# Patient Record
Sex: MALE | Race: WHITE | NOT HISPANIC OR LATINO | Employment: OTHER | URBAN - METROPOLITAN AREA
[De-identification: names, ages, dates, MRNs, and addresses within clinical notes are randomized per-mention and may not be internally consistent; named-entity substitution may affect disease eponyms.]

---

## 2017-03-29 RX ORDER — ALLOPURINOL 300 MG/1
TABLET ORAL
Qty: 30 TABLET | Refills: 1 | Status: SHIPPED | OUTPATIENT
Start: 2017-03-29 | End: 2017-12-04

## 2017-08-30 ENCOUNTER — PATIENT OUTREACH (OUTPATIENT)
Dept: ADMINISTRATIVE | Facility: HOSPITAL | Age: 67
End: 2017-08-30

## 2017-08-30 NOTE — PROGRESS NOTES
Pt returned my call and stated he will call back due to his wife is having some surgeries.  Pt also states he know he is way overdue.

## 2017-08-30 NOTE — LETTER
August 30, 2017    Raphael Nelson Jr.  [address] [facility] [address]           [facility]  [address]  [facility] [address]  Phone: [phone] Dear Raphael Nelson Jr.    In the spirit of maintaining your good health, our system indicates that you have not been seen in the office in over 12 months and due for a visit.     Our system indicates that you are due for the following:   Health Maintenance Due   Topic Date Due    Hepatitis C Screening  1950    Influenza Vaccine  08/01/2017     Kurt Davey MD would like you to schedule an appointment for an annual exam at your earliest convenience. If you have completed any of these health maintenance requirements at an outside facility, please contact our office so we may update your health record.    If your PRIMARY CARE PHYSICIAN has changed please contact your insurance company to reflect the correct physician.    If you have any issues or need assistance in scheduling this appointment, please call the number below.     LUIZ Lovelace  Care Coordination Department  [facility]  [phone]

## 2017-12-04 ENCOUNTER — OFFICE VISIT (OUTPATIENT)
Dept: FAMILY MEDICINE | Facility: CLINIC | Age: 67
End: 2017-12-04
Payer: COMMERCIAL

## 2017-12-04 VITALS
BODY MASS INDEX: 29.48 KG/M2 | SYSTOLIC BLOOD PRESSURE: 126 MMHG | DIASTOLIC BLOOD PRESSURE: 77 MMHG | HEART RATE: 72 BPM | HEIGHT: 69 IN | WEIGHT: 199.06 LBS

## 2017-12-04 DIAGNOSIS — R53.83 FATIGUE, UNSPECIFIED TYPE: Primary | ICD-10-CM

## 2017-12-04 DIAGNOSIS — Z87.898 HISTORY OF ELEVATED PSA: ICD-10-CM

## 2017-12-04 DIAGNOSIS — Z13.6 ENCOUNTER FOR LIPID SCREENING FOR CARDIOVASCULAR DISEASE: ICD-10-CM

## 2017-12-04 DIAGNOSIS — R07.9 RIGHT-SIDED CHEST PAIN: ICD-10-CM

## 2017-12-04 DIAGNOSIS — G47.9 SLEEP DISTURBANCE: ICD-10-CM

## 2017-12-04 DIAGNOSIS — Z11.59 NEED FOR HEPATITIS C SCREENING TEST: ICD-10-CM

## 2017-12-04 DIAGNOSIS — N40.0 BENIGN PROSTATIC HYPERPLASIA, UNSPECIFIED WHETHER LOWER URINARY TRACT SYMPTOMS PRESENT: ICD-10-CM

## 2017-12-04 DIAGNOSIS — Z13.220 ENCOUNTER FOR LIPID SCREENING FOR CARDIOVASCULAR DISEASE: ICD-10-CM

## 2017-12-04 PROCEDURE — 90662 IIV NO PRSV INCREASED AG IM: CPT | Mod: S$GLB,,, | Performed by: FAMILY MEDICINE

## 2017-12-04 PROCEDURE — 99999 PR PBB SHADOW E&M-EST. PATIENT-LVL III: CPT | Mod: PBBFAC,,, | Performed by: FAMILY MEDICINE

## 2017-12-04 PROCEDURE — 99214 OFFICE O/P EST MOD 30 MIN: CPT | Mod: 25,S$GLB,, | Performed by: FAMILY MEDICINE

## 2017-12-04 PROCEDURE — 90471 IMMUNIZATION ADMIN: CPT | Mod: S$GLB,,, | Performed by: FAMILY MEDICINE

## 2017-12-04 RX ORDER — MULTIVIT WITH MINERALS/HERBS
1 TABLET ORAL DAILY
COMMUNITY

## 2017-12-04 NOTE — PROGRESS NOTES
Subjective:      Patient ID: Raphael Nelson Jr. is a 67 y.o. male.    Chief Complaint: Annual Exam    HPI he complains of fatigue.   Fatigue: Patient complains of fatigue. Symptoms began several years ago. Sentinal symptom the patient feels fatigue began with: none. Symptoms of his fatigue have been general malaise. Patient describes the following psychologic symptoms: none.  Patient denies none. Symptoms have gradually worsened. Severity has been symptoms bothersome, but easily able to carry out all usual work/school/family activities. Previous visits for this problem: none.  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 He awakens tired.  He does not awaken drunk.  EPWORTH SLEEPINESS SCALE (ESS) -- The ESS subjectively measures sleepiness as it occurs in ordinary life situations. It can be used as a screening test for excessive sleepiness or to follow an individual's subjective response to an intervention.    Eight situations were described to the patient and the following responses were obtained on a questionnaire.  The question was, in the following situations, what is the likelihood that sleep would be induced?  Sitting and reading 0   Watching television 3   Sitting inactively in a public place 3   Riding as a passenger in a car for one hour without a break 3   Lying down to rest in the afternoon when circumstances permit 3   Sitting and talking with someone 0   Sitting quietly after lunch without alcohol 3   Sitting in a car as the , while stopped for a few minutes in traffic 0   TOTAL POINTS 15     Each situation receives a score of zero to three, which is related to the likelihood that sleep will be induced:  0 = would never doze  1 = slight chance of dozing  2 = moderate chance of dozing  3 = high chance of dozing    Thus, the total ESS score can range from zero to 24, with higher scores correlating with increasing degrees of sleepiness     1-6 points: Normal sleep   7-8 points: Average sleepiness    9-24 points: Abnormal (possibly pathologic) sleepiness     A score greater than ten is consistent with excessive sleepiness. This threshold between normal and abnormal subjective sleepiness is derived from an observational study of 180 adults. The mean ESS score was approximately six among healthy adults, compared to 16 or greater among patients with narcolepsy, idiopathic hypersomnia, or moderate to severe obstructive sleep apnea (LESTER, defined as a respiratory disturbance index [RDI] greater than 15 events per hour).  The ESS can be performed in the examination room or waiting room. It is relatively simple and generally takes only a few minutes to complete. It should be repeated at subsequent visits to assess for change.  Correlation -- Subjective sleepiness measured by the ESS appears to correlate moderately with objective sleep tendency. In the largest population based study, the increased risk of falling asleep during a four session multiple sleep latency test (MSLT) was 30 percent and 69 percent in individuals with intermediate (ESS score six to 11) and high (ESS score >12) subjective sleepiness, respectively. Despite this study, the strength of the correlation between the ESS and objective measures of sleepiness remains controversial due to conflicting small studies:  Early studies demonstrated strong correlation between the ESS and MSLT. This included a study that reported that a score derived from only three of the situations -- sitting inactively in a public place, sitting quietly after lunch, and sitting in a car stopped for a few minutes -- correlated well with mean sleep latency measured by the MSLT.  More recent studies have found little or no correlation between the ESS and the MSLT. In addition, weak correlation and discordant results between the ESS and the maintenance of wakefulness test (MWT) have been reported among patients with sleep apnea and narcolepsy. Advocates for the viewpoint that the  ESS correlates poorly with the MSLT and MWT hypothesize that the tests measure different aspects of sleepiness.  Advantages -- The ESS is widely available, reliable for assessing subjective sleepiness, and can be performed easily and quickly.  Disadvantages -- The ESS cannot be used on multiple occasions throughout the day and is not a good measure of short-term acute sleep loss. Although the ESS score tends to increase with the severity of LESTER and most patients with moderate to severe LESTER have an ESS score greater than ten, the ESS should not be used to screen for LESTER because it is neither sensitive nor specific for this purpose.    He does urinate at night also.  He drinks a lot of fluids. He is seeing Dr. Jones for the prostate.   He had extreme prostatitis several years ago and had a biopsy due to an increased PSA which was neg.   Lab Results   Component Value Date    PSA 1.5 05/14/2015      he also has had problemswith continue right chest wall pain. He has had a CXR and bone scan in 2015 but the pain persists and this causes him increased problems with sleeping.  He has not had exertional chest pain at all as he is very active.     Health Maintenance Due   Topic Date Due    Hepatitis C Screening  1950    Influenza Vaccine  08/01/2017       Past Medical History:  Past Medical History:   Diagnosis Date    Allergy     Anxiety     BPH (benign prostatic hyperplasia)     Cataract     Rotator cuff disorder      Past Surgical History:   Procedure Laterality Date    ADENOIDECTOMY      APPENDECTOMY      COLONOSCOPY  3/1/2013    was normal.    TONSILLECTOMY       Review of patient's allergies indicates:   Allergen Reactions    Codeine      Itching     Current Outpatient Prescriptions on File Prior to Visit   Medication Sig Dispense Refill    arginine HCl, L-arginine, 1,000 mg Tab Take by mouth once daily.      cholecalciferol, vitamin D3, (VITAMIN D3) 5,000 unit Tab Take 5,000 Units by mouth once  daily.      CIALIS 5 mg tablet Take 2.5 mg by mouth daily as needed.       ciclesonide (OMNARIS) 50 mcg Spry 2 sprays by Each Nare route once daily. 12.5 g 11    co-enzyme Q-10 30 mg capsule Take 200 mg by mouth once daily.      glucosamine HCl-msm 750-750 mg Tab Take 750 mg by mouth once daily.      ibuprofen (ADVIL) 200 MG tablet Take 400 mg by mouth every 6 (six) hours as needed for Pain.      krill oil 500 mg Cap Take by mouth once daily.      loratadine 10 mg Cap Take by mouth every other day.      multivitamin capsule Take 1 capsule by mouth once daily.      zinc gluconate 30 mg Tab Take by mouth once daily.      [DISCONTINUED] allopurinol (ZYLOPRIM) 300 MG tablet TAKE ONE TABLET BY MOUTH EVERY DAY AS NEEDED 30 tablet 1     No current facility-administered medications on file prior to visit.      Social History     Social History    Marital status:      Spouse name: Cynthia    Number of children: 5    Years of education: N/A     Occupational History    Not on file.     Social History Main Topics    Smoking status: Never Smoker    Smokeless tobacco: Never Used    Alcohol use Yes      Comment: Socially    Drug use: Unknown    Sexual activity: Yes     Partners: Female     Other Topics Concern    Not on file     Social History Narrative    No narrative on file     Family History   Problem Relation Age of Onset    Heart disease Mother     COPD Mother     Diabetes Father     Heart disease Father                  Review of Systems   Constitutional: Positive for fatigue. Negative for chills, diaphoresis and fever.   HENT: Negative for congestion, hearing loss, mouth sores, postnasal drip and sore throat.    Eyes: Negative for pain and visual disturbance.   Respiratory: Negative for cough, chest tightness, shortness of breath and wheezing.    Cardiovascular: Positive for chest pain.   Gastrointestinal: Negative for abdominal pain, anal bleeding, blood in stool, constipation, diarrhea,  "nausea and vomiting.   Genitourinary: Negative for dysuria and hematuria.   Musculoskeletal: Negative for back pain, neck pain and neck stiffness.   Skin: Negative for rash.   Neurological: Negative for dizziness and weakness.       Objective:   /77   Pulse 72   Ht 5' 9" (1.753 m)   Wt 90.3 kg (199 lb 1.2 oz)   BMI 29.40 kg/m²     Physical Exam   Constitutional: He is oriented to person, place, and time. He appears well-developed and well-nourished.   HENT:   Head: Normocephalic and atraumatic.   Right Ear: External ear normal.   Left Ear: External ear normal.   Nose: Nose normal.   Mouth/Throat: Oropharynx is clear and moist. No oropharyngeal exudate.   Eyes: Conjunctivae and EOM are normal. Pupils are equal, round, and reactive to light. Right eye exhibits no discharge. Left eye exhibits no discharge. No scleral icterus.   Neck: Normal range of motion. Neck supple. No JVD present. No thyromegaly present.   Cardiovascular: Normal rate, regular rhythm, normal heart sounds and intact distal pulses.  Exam reveals no gallop and no friction rub.    No murmur heard.  Pulmonary/Chest: Effort normal and breath sounds normal. No respiratory distress. He has no wheezes. He has no rales. He exhibits tenderness.   Abdominal: Soft. Bowel sounds are normal. He exhibits no distension and no mass. There is no tenderness. There is no rebound and no guarding.   Musculoskeletal: Normal range of motion. He exhibits no edema or tenderness.   Lymphadenopathy:     He has no cervical adenopathy.   Neurological: He is alert and oriented to person, place, and time. No cranial nerve deficit. Coordination normal.   Skin: Skin is warm and dry. He is not diaphoretic.   Psychiatric: He has a normal mood and affect.       Assessment:     1. Fatigue, unspecified type    2. History of elevated PSA    3. Benign prostatic hyperplasia, unspecified whether lower urinary tract symptoms present    4. Sleep disturbance    5. Right-sided chest " pain    6. Encounter for lipid screening for cardiovascular disease    7. Need for hepatitis C screening test        Plan:   Raphael was seen today for annual exam.    Diagnoses and all orders for this visit:    Fatigue, unspecified type  -     CBC auto differential; Future  -     Comprehensive metabolic panel; Future  -     TSH; Future  -     Testosterone; Future  -     Polysomnogram (CPAP will be added if patient meets diagnostic criteria.); Future    History of elevated PSA  -     PSA, Screening; Future    Benign prostatic hyperplasia, unspecified whether lower urinary tract symptoms present    Sleep disturbance  -     Polysomnogram (CPAP will be added if patient meets diagnostic criteria.); Future    Right-sided chest pain  -     CT Chest With Contrast; Future    Encounter for lipid screening for cardiovascular disease  -     Lipid panel; Future    Need for hepatitis C screening test  -     Hepatitis C antibody; Future    Other orders  -     Influenza - High Dose (65+) (PF) (IM)      rtc 1 year.

## 2017-12-05 ENCOUNTER — HOSPITAL ENCOUNTER (OUTPATIENT)
Dept: RADIOLOGY | Facility: HOSPITAL | Age: 67
Discharge: HOME | End: 2017-12-05
Attending: FAMILY MEDICINE
Payer: COMMERCIAL

## 2017-12-05 ENCOUNTER — LAB VISIT (OUTPATIENT)
Dept: LAB | Facility: HOSPITAL | Age: 67
End: 2017-12-05
Attending: FAMILY MEDICINE
Payer: COMMERCIAL

## 2017-12-05 DIAGNOSIS — Z11.59 NEED FOR HEPATITIS C SCREENING TEST: ICD-10-CM

## 2017-12-05 DIAGNOSIS — R53.83 FATIGUE, UNSPECIFIED TYPE: ICD-10-CM

## 2017-12-05 DIAGNOSIS — R07.9 RIGHT-SIDED CHEST PAIN: ICD-10-CM

## 2017-12-05 DIAGNOSIS — R07.9 RIGHT-SIDED CHEST PAIN: Primary | ICD-10-CM

## 2017-12-05 DIAGNOSIS — Z13.6 ENCOUNTER FOR LIPID SCREENING FOR CARDIOVASCULAR DISEASE: ICD-10-CM

## 2017-12-05 DIAGNOSIS — Z13.220 ENCOUNTER FOR LIPID SCREENING FOR CARDIOVASCULAR DISEASE: ICD-10-CM

## 2017-12-05 DIAGNOSIS — Z87.898 HISTORY OF ELEVATED PSA: ICD-10-CM

## 2017-12-05 PROBLEM — E78.5 HYPERLIPIDEMIA: Status: ACTIVE | Noted: 2017-12-05

## 2017-12-05 PROBLEM — D72.10 EOSINOPHILIA: Status: ACTIVE | Noted: 2017-12-05

## 2017-12-05 LAB
ALBUMIN SERPL BCP-MCNC: 4 G/DL
ALP SERPL-CCNC: 60 U/L
ALT SERPL W/O P-5'-P-CCNC: 22 U/L
ANION GAP SERPL CALC-SCNC: 10 MMOL/L
AST SERPL-CCNC: 20 U/L
BASOPHILS # BLD AUTO: 0.04 K/UL
BASOPHILS NFR BLD: 0.9 %
BILIRUB SERPL-MCNC: 0.4 MG/DL
BUN SERPL-MCNC: 18 MG/DL
CALCIUM SERPL-MCNC: 9.5 MG/DL
CHLORIDE SERPL-SCNC: 107 MMOL/L
CHOLEST SERPL-MCNC: 212 MG/DL
CHOLEST/HDLC SERPL: 3.9 {RATIO}
CO2 SERPL-SCNC: 25 MMOL/L
COMPLEXED PSA SERPL-MCNC: 0.84 NG/ML
CREAT SERPL-MCNC: 1.1 MG/DL
DIFFERENTIAL METHOD: ABNORMAL
EOSINOPHIL # BLD AUTO: 0.4 K/UL
EOSINOPHIL NFR BLD: 9.5 %
ERYTHROCYTE [DISTWIDTH] IN BLOOD BY AUTOMATED COUNT: 13.3 %
EST. GFR  (AFRICAN AMERICAN): >60 ML/MIN/1.73 M^2
EST. GFR  (NON AFRICAN AMERICAN): >60 ML/MIN/1.73 M^2
GLUCOSE SERPL-MCNC: 99 MG/DL
HCT VFR BLD AUTO: 43.3 %
HDLC SERPL-MCNC: 54 MG/DL
HDLC SERPL: 25.5 %
HGB BLD-MCNC: 14 G/DL
IMM GRANULOCYTES # BLD AUTO: 0.01 K/UL
IMM GRANULOCYTES NFR BLD AUTO: 0.2 %
LDLC SERPL CALC-MCNC: 143 MG/DL
LYMPHOCYTES # BLD AUTO: 0.9 K/UL
LYMPHOCYTES NFR BLD: 19 %
MCH RBC QN AUTO: 29.7 PG
MCHC RBC AUTO-ENTMCNC: 32.3 G/DL
MCV RBC AUTO: 92 FL
MONOCYTES # BLD AUTO: 0.5 K/UL
MONOCYTES NFR BLD: 11.5 %
NEUTROPHILS # BLD AUTO: 2.7 K/UL
NEUTROPHILS NFR BLD: 58.9 %
NONHDLC SERPL-MCNC: 158 MG/DL
NRBC BLD-RTO: 0 /100 WBC
PLATELET # BLD AUTO: 158 K/UL
PMV BLD AUTO: 11 FL
POTASSIUM SERPL-SCNC: 4.6 MMOL/L
PROT SERPL-MCNC: 7.4 G/DL
RBC # BLD AUTO: 4.71 M/UL
SODIUM SERPL-SCNC: 142 MMOL/L
TESTOST SERPL-MCNC: 647 NG/DL
TRIGL SERPL-MCNC: 75 MG/DL
TSH SERPL DL<=0.005 MIU/L-ACNC: 2.92 UIU/ML
WBC # BLD AUTO: 4.53 K/UL

## 2017-12-05 PROCEDURE — 36415 COLL VENOUS BLD VENIPUNCTURE: CPT | Mod: PO

## 2017-12-05 PROCEDURE — 80061 LIPID PANEL: CPT

## 2017-12-05 PROCEDURE — 71260 CT THORAX DX C+: CPT | Mod: 26,,, | Performed by: RADIOLOGY

## 2017-12-05 PROCEDURE — 85025 COMPLETE CBC W/AUTO DIFF WBC: CPT

## 2017-12-05 PROCEDURE — 71260 CT THORAX DX C+: CPT | Mod: TC,PO

## 2017-12-05 PROCEDURE — 25500020 PHARM REV CODE 255: Mod: PO | Performed by: FAMILY MEDICINE

## 2017-12-05 PROCEDURE — 86803 HEPATITIS C AB TEST: CPT

## 2017-12-05 PROCEDURE — 80053 COMPREHEN METABOLIC PANEL: CPT

## 2017-12-05 PROCEDURE — 84153 ASSAY OF PSA TOTAL: CPT

## 2017-12-05 PROCEDURE — 84443 ASSAY THYROID STIM HORMONE: CPT

## 2017-12-05 PROCEDURE — 84403 ASSAY OF TOTAL TESTOSTERONE: CPT

## 2017-12-05 RX ADMIN — IOHEXOL 75 ML: 350 INJECTION, SOLUTION INTRAVENOUS at 12:12

## 2017-12-06 ENCOUNTER — TELEPHONE (OUTPATIENT)
Dept: FAMILY MEDICINE | Facility: CLINIC | Age: 67
End: 2017-12-06

## 2017-12-06 LAB — HCV AB SERPL QL IA: NEGATIVE

## 2017-12-06 NOTE — PROGRESS NOTES
The patient has a lot of fatigue.  I don't know if this is going to be significant or not but the eosinophils are mildly elevated.  Due to the symptoms, I would like for him to see Dr. Gil who works this type of thing up for parasitic infections and blood disorders that can cause an eosinophilia. Please refer him and arrange the appointment.     I have reviewed the CMP which is a comprehensive metabolic panel of all electrolytes including a look at the liver, kidney and to look at your sugar level.  All of the numbers appear acceptable.  Please consider rechecking this in one year at the time of the annual physical if it is still indicated.    The TSH level,  which is a measure of the thyroid function, is normal.   No further workup on the thyroid is needed.     The current value for the PSA is considered normal.  Please recheck this in 1 year.     The testosterone is normal.   Please send this to his urologist.      The lipid profile is inducing the ASCVD score to be high.  Based on this, I recommend treatment of the cholesterol to reduce risk.  Start lipitor 40 mg po q day #30 and rf x 2 and recheck a lipid/liver in 3 months.     The 10-year ASCVD risk score (Yoko AVI Jr., et al., 2013) is: 15.3%    Values used to calculate the score:      Age: 67 years      Sex: Male      Is Non- : No      Diabetic: No      Tobacco smoker: No      Systolic Blood Pressure: 126 mmHg      Is BP treated: No      HDL Cholesterol: 54 mg/dL      Total Cholesterol: 212 mg/dL

## 2017-12-06 NOTE — TELEPHONE ENCOUNTER
Arrange for him to have a home study for sleep apnea evaluation.         I received this message and they denied the inhouse study.     Message     Per HH:  this case is denied by the medical director stating that it is not medically necessary. Also there are no contraindications to having a home study or no co-morbid conditions that would make a facility study more appropriate.

## 2017-12-07 ENCOUNTER — TELEPHONE (OUTPATIENT)
Dept: FAMILY MEDICINE | Facility: CLINIC | Age: 67
End: 2017-12-07

## 2017-12-07 ENCOUNTER — TELEPHONE (OUTPATIENT)
Dept: PULMONOLOGY | Facility: CLINIC | Age: 67
End: 2017-12-07

## 2017-12-07 DIAGNOSIS — R53.83 FATIGUE, UNSPECIFIED TYPE: Primary | ICD-10-CM

## 2017-12-07 NOTE — TELEPHONE ENCOUNTER
I have signed for the following orders AND/OR meds.  Please call the patient and ask the patient to schedule the testing AND/OR inform about any medications that were sent.      Orders Placed This Encounter   Procedures    Home Sleep Studies     Standing Status:   Future     Standing Expiration Date:   12/7/2018

## 2017-12-08 ENCOUNTER — TELEPHONE (OUTPATIENT)
Dept: FAMILY MEDICINE | Facility: CLINIC | Age: 67
End: 2017-12-08

## 2017-12-08 DIAGNOSIS — E78.5 HYPERLIPIDEMIA, UNSPECIFIED HYPERLIPIDEMIA TYPE: Primary | ICD-10-CM

## 2017-12-08 NOTE — TELEPHONE ENCOUNTER
We can offer zetia or welchol but since they don't reduce inflammation of the arteries like statins, they are not as effective at altering outcomes.  However they do reduce lipids like statins so there is some reduction.  May we start one of them (zetia or welchoL)

## 2017-12-08 NOTE — TELEPHONE ENCOUNTER
----- Message from Kurt Davey MD sent at 12/5/2017  8:35 PM CST -----  The patient has a lot of fatigue.  I don't know if this is going to be significant or not but the eosinophils are mildly elevated.  Due to the symptoms, I would like for him to see Dr. Gil who works this type of thing up for parasitic infections and blood disorders that can cause an eosinophilia. Please refer him and arrange the appointment.     I have reviewed the CMP which is a comprehensive metabolic panel of all electrolytes including a look at the liver, kidney and to look at your sugar level.  All of the numbers appear acceptable.  Please consider rechecking this in one year at the time of the annual physical if it is still indicated.    The TSH level,  which is a measure of the thyroid function, is normal.   No further workup on the thyroid is needed.     The current value for the PSA is considered normal.  Please recheck this in 1 year.     The testosterone is normal.   Please send this to his urologist.      The lipid profile is inducing the ASCVD score to be high.  Based on this, I recommend treatment of the cholesterol to reduce risk.  Start lipitor 40 mg po q day #30 and rf x 2 and recheck a lipid/liver in 3 months.     The 10-year ASCVD risk score (Montgomery DC Jr., et al., 2013) is: 15.3%    Values used to calculate the score:      Age: 67 years      Sex: Male      Is Non- : No      Diabetic: No      Tobacco smoker: No      Systolic Blood Pressure: 126 mmHg      Is BP treated: No      HDL Cholesterol: 54 mg/dL      Total Cholesterol: 212 mg/dL

## 2017-12-11 RX ORDER — COLESEVELAM HYDROCHLORIDE 3.75 G/1
3.75 POWDER, FOR SUSPENSION ORAL DAILY
Qty: 30 EACH | Refills: 2 | Status: SHIPPED | OUTPATIENT
Start: 2017-12-11 | End: 2019-01-29

## 2017-12-11 NOTE — TELEPHONE ENCOUNTER
I have signed for the following orders AND/OR meds.  Please call the patient and ask the patient to schedule the testing AND/OR inform about any medications that were sent.      Orders Placed This Encounter   Procedures    Hepatic function panel     Standing Status:   Future     Standing Expiration Date:   4/10/2018    Lipid panel     Standing Status:   Future     Standing Expiration Date:   4/10/2018    Ambulatory referral to Hematology / Oncology     Referral Priority:   Routine     Referral Type:   Consultation     Referral Reason:   Specialty Services Required     Referred to Provider:   Vishnu Gil MD     Requested Specialty:   Hematology and Oncology     Number of Visits Requested:   1         Medications Ordered This Encounter      colesevelam (WELCHOL) 3.75 gram PwPk          Sig: Take 3.75 g by mouth once daily.          Dispense:  30 each          Refill:  2

## 2017-12-11 NOTE — TELEPHONE ENCOUNTER
Pt states he will try the Welchol. 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

## 2018-03-12 ENCOUNTER — LAB VISIT (OUTPATIENT)
Dept: LAB | Facility: HOSPITAL | Age: 68
End: 2018-03-12
Attending: FAMILY MEDICINE
Payer: COMMERCIAL

## 2018-03-12 DIAGNOSIS — E78.5 HYPERLIPIDEMIA, UNSPECIFIED HYPERLIPIDEMIA TYPE: ICD-10-CM

## 2018-03-12 LAB
ALBUMIN SERPL BCP-MCNC: 4.2 G/DL
ALP SERPL-CCNC: 61 U/L
ALT SERPL W/O P-5'-P-CCNC: 32 U/L
AST SERPL-CCNC: 25 U/L
BILIRUB DIRECT SERPL-MCNC: 0.2 MG/DL
BILIRUB SERPL-MCNC: 0.4 MG/DL
CHOLEST SERPL-MCNC: 158 MG/DL
CHOLEST/HDLC SERPL: 4 {RATIO}
HDLC SERPL-MCNC: 40 MG/DL
HDLC SERPL: 25.3 %
LDLC SERPL CALC-MCNC: 104.2 MG/DL
NONHDLC SERPL-MCNC: 118 MG/DL
PROT SERPL-MCNC: 7.3 G/DL
TRIGL SERPL-MCNC: 69 MG/DL

## 2018-03-12 PROCEDURE — 80076 HEPATIC FUNCTION PANEL: CPT

## 2018-03-12 PROCEDURE — 80061 LIPID PANEL: CPT

## 2018-03-12 PROCEDURE — 36415 COLL VENOUS BLD VENIPUNCTURE: CPT | Mod: PO

## 2018-05-15 ENCOUNTER — TELEPHONE (OUTPATIENT)
Dept: FAMILY MEDICINE | Facility: CLINIC | Age: 68
End: 2018-05-15

## 2018-05-15 NOTE — TELEPHONE ENCOUNTER
----- Message from Zabrina Carlson sent at 5/15/2018 11:51 AM CDT -----  Contact: Patient   Patient returned call. Please call patient back at  [phone] (bdld)

## 2018-08-07 DIAGNOSIS — M25.561 ACUTE PAIN OF RIGHT KNEE: Primary | ICD-10-CM

## 2018-08-08 ENCOUNTER — OFFICE VISIT (OUTPATIENT)
Dept: ORTHOPEDICS | Facility: CLINIC | Age: 68
End: 2018-08-08
Payer: COMMERCIAL

## 2018-08-08 ENCOUNTER — HOSPITAL ENCOUNTER (OUTPATIENT)
Dept: RADIOLOGY | Facility: HOSPITAL | Age: 68
Discharge: HOME | End: 2018-08-08
Attending: ORTHOPAEDIC SURGERY
Payer: COMMERCIAL

## 2018-08-08 VITALS — HEIGHT: 71 IN | BODY MASS INDEX: 28.42 KG/M2 | WEIGHT: 203 LBS

## 2018-08-08 DIAGNOSIS — M25.561 ACUTE PAIN OF RIGHT KNEE: ICD-10-CM

## 2018-08-08 DIAGNOSIS — S83.241A ACUTE MEDIAL MENISCUS TEAR OF RIGHT KNEE, INITIAL ENCOUNTER: ICD-10-CM

## 2018-08-08 DIAGNOSIS — M94.261 CHONDROMALACIA OF KNEE, RIGHT: Primary | ICD-10-CM

## 2018-08-08 PROCEDURE — 99203 OFFICE O/P NEW LOW 30 MIN: CPT | Mod: 25,S$GLB,, | Performed by: ORTHOPAEDIC SURGERY

## 2018-08-08 PROCEDURE — 73562 X-RAY EXAM OF KNEE 3: CPT | Mod: 26,XS,LT, | Performed by: RADIOLOGY

## 2018-08-08 PROCEDURE — 73562 X-RAY EXAM OF KNEE 3: CPT | Mod: TC,PO,LT

## 2018-08-08 PROCEDURE — 20611 DRAIN/INJ JOINT/BURSA W/US: CPT | Mod: RT,S$GLB,, | Performed by: ORTHOPAEDIC SURGERY

## 2018-08-08 PROCEDURE — 99999 PR PBB SHADOW E&M-EST. PATIENT-LVL II: CPT | Mod: PBBFAC,,, | Performed by: ORTHOPAEDIC SURGERY

## 2018-08-08 PROCEDURE — 73564 X-RAY EXAM KNEE 4 OR MORE: CPT | Mod: 26,RT,, | Performed by: RADIOLOGY

## 2018-08-08 RX ORDER — DUTASTERIDE 0.5 MG/1
CAPSULE, LIQUID FILLED ORAL
COMMUNITY
Start: 2018-07-24 | End: 2021-12-17 | Stop reason: SDUPTHER

## 2018-08-08 RX ORDER — TAMSULOSIN HYDROCHLORIDE 0.4 MG/1
CAPSULE ORAL
COMMUNITY
Start: 2018-07-25 | End: 2021-12-17 | Stop reason: SDUPTHER

## 2018-08-08 RX ORDER — TRIAMCINOLONE ACETONIDE 40 MG/ML
40 INJECTION, SUSPENSION INTRA-ARTICULAR; INTRAMUSCULAR
Status: DISCONTINUED | OUTPATIENT
Start: 2018-08-08 | End: 2018-08-08 | Stop reason: HOSPADM

## 2018-08-08 RX ADMIN — TRIAMCINOLONE ACETONIDE 40 MG: 40 INJECTION, SUSPENSION INTRA-ARTICULAR; INTRAMUSCULAR at 11:08

## 2018-08-08 NOTE — PROCEDURES
Large Joint Aspiration/Injection  Date/Time: 8/8/2018 11:10 AM  Performed by: DUYEN PARIS  Authorized by: DUYEN PARIS     Consent Done?:  Yes (Verbal)  Indications:  Pain  Timeout: Prior to procedure the correct patient, procedure, and site was verified      Location:  Knee  Site:  R knee  Prep: Patient was prepped and draped in usual sterile fashion    Ultrasonic Guidance for needle placement: Yes  Images are saved and documented.  Needle size:  22 G  Approach:  Anterolateral  Medications:  40 mg triamcinolone acetonide 40 mg/mL  Patient tolerance:  Patient tolerated the procedure well with no immediate complications

## 2018-08-08 NOTE — PROGRESS NOTES
DATE: 8/8/2018  PATIENT: Raphael Nelson Jr.  REFERRING MD:  CHIEF COMPLAINT:   Chief Complaint   Patient presents with    Right Knee - Pain       HISTORY:  Raphael Nelson Jr. is a 67 y.o. male  who presents for initial evaluation of his right knee.  States he is well until approximately 2 weeks ago when he woke up with significant discomfort.  States the day before he did a lot of golf, dancing and sailing on his boat.  Noted mild swelling.  He has been limping.  He has been taking Aleve 2 tablets twice a day without significant improvement.  He denies any previous knee injuries.  Pain is rated at 4/10.  He has difficulty playing golf and presents for evaluation.  He is employed part-time as a home care nurse      PAST MEDICAL/SURGICAL HISTORY:  Past Medical History:   Diagnosis Date    Allergy     Anxiety     BPH (benign prostatic hyperplasia)     Cataract     Hyperlipidemia 12/5/2017    The cholesterol has been high and the ASCVD score is also high inducing the desire to treat this with mediciations.  Lab Results Component Value Date  CHOL 212 (H) 12/05/2017  CHOL 184 05/14/2015  Lab Results Component Value Date  HDL 54 12/05/2017  HDL 47 05/14/2015  Lab Results Component Value Date  LDLCALC 143.0 12/05/2017  LDLCALC 118.4 05/14/2015  Lab Results Component Value Date  TRIG 75 12/    Rotator cuff disorder      Past Surgical History:   Procedure Laterality Date    ADENOIDECTOMY      APPENDECTOMY      COLONOSCOPY  3/1/2013    was normal.    TONSILLECTOMY         Current Medications:   Current Outpatient Prescriptions:     arginine HCl, L-arginine, 1,000 mg Tab, Take by mouth once daily., Disp: , Rfl:     b complex vitamins tablet, Take 1 tablet by mouth once daily., Disp: , Rfl:     cholecalciferol, vitamin D3, (VITAMIN D3) 5,000 unit Tab, Take 5,000 Units by mouth once daily., Disp: , Rfl:     CIALIS 5 mg tablet, Take 2.5 mg by mouth daily as needed. , Disp: , Rfl:     ciclesonide (OMNARIS) 50 mcg  Spry, 2 sprays by Each Nare route once daily., Disp: 12.5 g, Rfl: 11    co-enzyme Q-10 30 mg capsule, Take 200 mg by mouth once daily., Disp: , Rfl:     colesevelam (WELCHOL) 3.75 gram PwPk, Take 3.75 g by mouth once daily., Disp: 30 each, Rfl: 2    dutasteride (AVODART) 0.5 mg capsule, , Disp: , Rfl:     glucosamine HCl-msm 750-750 mg Tab, Take 750 mg by mouth once daily., Disp: , Rfl:     ibuprofen (ADVIL) 200 MG tablet, Take 400 mg by mouth every 6 (six) hours as needed for Pain., Disp: , Rfl:     krill oil 500 mg Cap, Take by mouth once daily., Disp: , Rfl:     loratadine 10 mg Cap, Take by mouth every other day., Disp: , Rfl:     MAGNESIUM GLUCONATE ORAL, Take 400 mg by mouth once daily., Disp: , Rfl:     multivitamin capsule, Take 1 capsule by mouth once daily., Disp: , Rfl:     tamsulosin (FLOMAX) 0.4 mg Cap, , Disp: , Rfl:     zinc gluconate 30 mg Tab, Take by mouth once daily., Disp: , Rfl:     Family History: family history was reviewed and is noncontributory  Social History:   Social History     Social History    Marital status:      Spouse name: Cynthia    Number of children: 5    Years of education: N/A     Occupational History    Not on file.     Social History Main Topics    Smoking status: Never Smoker    Smokeless tobacco: Never Used    Alcohol use Yes      Comment: Socially    Drug use: Unknown    Sexual activity: Yes     Partners: Female     Other Topics Concern    Not on file     Social History Narrative    No narrative on file       ROS:  Constitution: Negative for chills, fever, and sweats. Negative for unexplained weight loss.  HENT: Negative for headaches and blurry vision.   Cardiovascular: Negative for chest pain, irregular heartbeat, leg swelling and palpitations.   Respiratory: Negative for cough and shortness of breath.   Gastrointestinal: Negative for abdominal pain, heartburn, nausea and vomiting.   Genitourinary: Negative for bladder incontinence and  "dysuria.   Musculoskeletal: Negative for systemic arthritis, muscle weakness and myalgias.   Neurological: Negative for numbness.   Psychiatric/Behavioral: Negative for depression.  Endocrine: Negative for polyuria.   Hematologic/Lymphatic: Negative for bleeding disorders.   Skin: Negative for poor wound healing.        PHYSICAL EXAM:  Ht 5' 11" (1.803 m)   Wt 92.1 kg (203 lb)   BMI 28.31 kg/m²   Raphael Nelson Jr. is a well developed, well nourished male in no acute distress. Physical examination of the right knee evaluated the following:    Gait and Alignment  Inspection for ecchymosis, swelling, atrophy, or deformity  Inspection for intra-articular and/or bursal effusions  Tenderness to palpation over the bony and soft tissue structures around the knee  Range of Motion and presence of extensor lag/contractures  Sensation and motor strength  Varus/valgus or anterior/posterior/rotatory instability  Flexion pinch and Robert's Tests  Patellar alignment/tracking/pain to palpation  Vascular exam to include skin temperature/color/capillary refill    Remarkable findings included:  Normal alignment. No effusion.  Mild medial joint line tenderness. Range of motion 0-130 degrees of flexion. No instability on exam.  Flexion pinch is negative.  Robert's test nonspecific        IMAGING:   The patient's knee radiographs were personally reviewed and compared to the radiologist's report. No acute fractures or dislocations are seen. The medial and lateral compartments are well preserved without degenerative changes or malalignment. The patellofemoral joint is also without degenerative changes or malalignment. No bony or soft tissue lesions are seen. No loose bodies or calcifications are present.       ASSESSMENT:   Chondromalacia versusmedial meniscus tear right knee    PLAN:  The nature of the diagnosis, using models and diagrams when appropriate, was explained to the patient and his wife in detail.  Treatment options " discussed included observation, cortisone injection, and MRI to rule out a medial meniscus tear.. All questions answered and the patient wishes to proceed with cortisone injection (performed today).  Her monitor symptoms over the next few weeks.  Should he continue to remain symptomatic, we will contact the office schedule an MRI of the right knee to rule out a medial meniscus tear.  Follow-up if not improving or worse.      This note was dictated using voice recognition software. Please excuse any grammatical or typographical errors.

## 2019-01-29 ENCOUNTER — OFFICE VISIT (OUTPATIENT)
Dept: FAMILY MEDICINE | Facility: CLINIC | Age: 69
End: 2019-01-29
Payer: COMMERCIAL

## 2019-01-29 ENCOUNTER — HOSPITAL ENCOUNTER (OUTPATIENT)
Dept: RADIOLOGY | Facility: HOSPITAL | Age: 69
Discharge: HOME | End: 2019-01-29
Attending: NURSE PRACTITIONER
Payer: COMMERCIAL

## 2019-01-29 VITALS
SYSTOLIC BLOOD PRESSURE: 126 MMHG | BODY MASS INDEX: 29.09 KG/M2 | HEART RATE: 66 BPM | WEIGHT: 203.19 LBS | TEMPERATURE: 98 F | HEIGHT: 70 IN | DIASTOLIC BLOOD PRESSURE: 70 MMHG

## 2019-01-29 DIAGNOSIS — M54.9 RIGHT-SIDED BACK PAIN, UNSPECIFIED BACK LOCATION, UNSPECIFIED CHRONICITY: ICD-10-CM

## 2019-01-29 DIAGNOSIS — M54.9 RIGHT-SIDED BACK PAIN, UNSPECIFIED BACK LOCATION, UNSPECIFIED CHRONICITY: Primary | ICD-10-CM

## 2019-01-29 PROCEDURE — 1101F PT FALLS ASSESS-DOCD LE1/YR: CPT | Mod: CPTII,S$GLB,, | Performed by: NURSE PRACTITIONER

## 2019-01-29 PROCEDURE — 1101F PR PT FALLS ASSESS DOC 0-1 FALLS W/OUT INJ PAST YR: ICD-10-PCS | Mod: CPTII,S$GLB,, | Performed by: NURSE PRACTITIONER

## 2019-01-29 PROCEDURE — 72100 X-RAY EXAM L-S SPINE 2/3 VWS: CPT | Mod: TC,PO

## 2019-01-29 PROCEDURE — 72070 XR THORACIC SPINE AP LATERAL: ICD-10-PCS | Mod: 26,,, | Performed by: RADIOLOGY

## 2019-01-29 PROCEDURE — 99999 PR PBB SHADOW E&M-EST. PATIENT-LVL III: ICD-10-PCS | Mod: PBBFAC,,, | Performed by: NURSE PRACTITIONER

## 2019-01-29 PROCEDURE — 72070 X-RAY EXAM THORAC SPINE 2VWS: CPT | Mod: 26,,, | Performed by: RADIOLOGY

## 2019-01-29 PROCEDURE — 99213 PR OFFICE/OUTPT VISIT, EST, LEVL III, 20-29 MIN: ICD-10-PCS | Mod: S$GLB,,, | Performed by: NURSE PRACTITIONER

## 2019-01-29 PROCEDURE — 72100 XR LUMBAR SPINE AP AND LATERAL: ICD-10-PCS | Mod: 26,,, | Performed by: RADIOLOGY

## 2019-01-29 PROCEDURE — 72100 X-RAY EXAM L-S SPINE 2/3 VWS: CPT | Mod: 26,,, | Performed by: RADIOLOGY

## 2019-01-29 PROCEDURE — 99999 PR PBB SHADOW E&M-EST. PATIENT-LVL III: CPT | Mod: PBBFAC,,, | Performed by: NURSE PRACTITIONER

## 2019-01-29 PROCEDURE — 99213 OFFICE O/P EST LOW 20 MIN: CPT | Mod: S$GLB,,, | Performed by: NURSE PRACTITIONER

## 2019-01-29 PROCEDURE — 72070 X-RAY EXAM THORAC SPINE 2VWS: CPT | Mod: TC,PO

## 2019-01-29 RX ORDER — CYCLOBENZAPRINE HCL 10 MG
10 TABLET ORAL 3 TIMES DAILY PRN
Qty: 30 TABLET | Refills: 0 | Status: SHIPPED | OUTPATIENT
Start: 2019-01-29 | End: 2019-02-08

## 2019-01-29 RX ORDER — PREDNISONE 20 MG/1
20 TABLET ORAL 2 TIMES DAILY
Qty: 10 TABLET | Refills: 0 | Status: SHIPPED | OUTPATIENT
Start: 2019-01-29 | End: 2019-02-03

## 2019-01-29 RX ORDER — IBUPROFEN 200 MG
800 TABLET ORAL 3 TIMES DAILY
COMMUNITY

## 2019-01-29 NOTE — PROGRESS NOTES
Subjective:       Patient ID: Raphael Nelson Jr. is a 68 y.o. male.    Chief Complaint: Back Pain    Back Pain   This is a new problem. The current episode started in the past 7 days. The problem occurs constantly. The problem is unchanged. The pain is present in the lumbar spine and thoracic spine. The quality of the pain is described as aching and cramping. The pain does not radiate. The pain is at a severity of 7/10. Pertinent negatives include no abdominal pain, chest pain, dysuria, fever or headaches. He has tried analgesics, heat and NSAIDs for the symptoms. The treatment provided no relief.       Review of Systems   Constitutional: Negative for fatigue, fever and unexpected weight change.   HENT: Negative for ear pain and sore throat.    Eyes: Negative.  Negative for pain and visual disturbance.   Respiratory: Negative for cough and shortness of breath.    Cardiovascular: Negative for chest pain and palpitations.   Gastrointestinal: Negative for abdominal pain, diarrhea, nausea and vomiting.   Genitourinary: Negative for dysuria and frequency.   Musculoskeletal: Positive for back pain. Negative for arthralgias and myalgias.   Skin: Negative for color change and rash.   Neurological: Negative for dizziness and headaches.   Psychiatric/Behavioral: Negative for sleep disturbance. The patient is not nervous/anxious.        Vitals:    01/29/19 1448   BP: 126/70   Pulse: 66   Temp: 98.3 °F (36.8 °C)       Objective:     Current Outpatient Medications   Medication Sig Dispense Refill    arginine HCl, L-arginine, 1,000 mg Tab Take by mouth once daily.      b complex vitamins tablet Take 1 tablet by mouth once daily.      cholecalciferol, vitamin D3, (VITAMIN D3) 5,000 unit Tab Take 5,000 Units by mouth once daily.      CIALIS 5 mg tablet Take 2.5 mg by mouth daily as needed.       dutasteride (AVODART) 0.5 mg capsule       ibuprofen (ADVIL,MOTRIN) 200 MG tablet Take 200 mg by mouth every 6 (six) hours as  needed for Pain.      krill oil 500 mg Cap Take by mouth once daily.      multivitamin capsule Take 1 capsule by mouth once daily.      tamsulosin (FLOMAX) 0.4 mg Cap       ciclesonide (OMNARIS) 50 mcg Spry 2 sprays by Each Nare route once daily. 12.5 g 11    cyclobenzaprine (FLEXERIL) 10 MG tablet Take 1 tablet (10 mg total) by mouth 3 (three) times daily as needed for Muscle spasms. 30 tablet 0    predniSONE (DELTASONE) 20 MG tablet Take 1 tablet (20 mg total) by mouth 2 (two) times daily. for 5 days 10 tablet 0     No current facility-administered medications for this visit.        Physical Exam   Constitutional: He is oriented to person, place, and time. He appears well-developed. No distress.   HENT:   Head: Normocephalic and atraumatic.   Eyes: EOM are normal. Pupils are equal, round, and reactive to light.   Neck: Normal range of motion. Neck supple.   Cardiovascular: Normal rate and regular rhythm.   Pulmonary/Chest: Effort normal and breath sounds normal.   Musculoskeletal:        Thoracic back: He exhibits tenderness.        Lumbar back: He exhibits decreased range of motion and tenderness.   Neurological: He is alert and oriented to person, place, and time.   Skin: Skin is warm and dry. No rash noted.   Psychiatric: He has a normal mood and affect. Thought content normal.   Nursing note and vitals reviewed.      Assessment:       1. Right-sided back pain, unspecified back location, unspecified chronicity        Plan:   Right-sided back pain, unspecified back location, unspecified chronicity  -     X-Ray Thoracic Spine AP Lateral; Future; Expected date: 01/29/2019  -     X-Ray Lumbar Spine AP And Lateral; Future; Expected date: 01/29/2019    Other orders  -     cyclobenzaprine (FLEXERIL) 10 MG tablet; Take 1 tablet (10 mg total) by mouth 3 (three) times daily as needed for Muscle spasms.  Dispense: 30 tablet; Refill: 0  -     predniSONE (DELTASONE) 20 MG tablet; Take 1 tablet (20 mg total) by mouth 2  (two) times daily. for 5 days  Dispense: 10 tablet; Refill: 0        No Follow-up on file.    There are no Patient Instructions on file for this visit.

## 2019-02-05 ENCOUNTER — TELEPHONE (OUTPATIENT)
Dept: FAMILY MEDICINE | Facility: CLINIC | Age: 69
End: 2019-02-05

## 2019-02-14 ENCOUNTER — TELEPHONE (OUTPATIENT)
Dept: FAMILY MEDICINE | Facility: CLINIC | Age: 69
End: 2019-02-14

## 2019-02-14 NOTE — TELEPHONE ENCOUNTER
----- Message from Rayray Pedersen NP sent at 2/13/2019 10:26 PM CST -----  XR shows arthritis and changes that involve the tendons and ligaments around the spine - these combined cause joint and muscle pain  There is also bone spurs and degeneration of the lumbar spine  If pain continues we can refer to pain management for treatment

## 2019-02-14 NOTE — TELEPHONE ENCOUNTER
----- Message from Denisse Tatumite sent at 2/14/2019  9:48 AM CST -----  Type:  Test Results    Who Called: Pt   Name of Test (Lab/Mammo/Etc): Xray   Date of Test: 1/29/19  Ordering Provider:  Rayray Pedersen   Where the test was performed: [facility]   Would the patient rather a call back or a response via [facility]? Call back   Best Call Back Number: [phone] (home)

## 2019-02-14 NOTE — TELEPHONE ENCOUNTER
----- Message from Celine Ghosh sent at 2/14/2019  1:59 PM CST -----  Contact: pt  Type:  Patient Returning Call    Who Called: pt  Who Left Message for Patient: dionne   Does the patient know what this is regarding?:   Would the patient rather a call back or a response via [email or web address]? Call back   Best Call Back Number:[phone] (home)     Additional Information:

## 2019-02-15 ENCOUNTER — TELEPHONE (OUTPATIENT)
Dept: FAMILY MEDICINE | Facility: CLINIC | Age: 69
End: 2019-02-15

## 2019-02-15 DIAGNOSIS — M54.9 BACK PAIN, UNSPECIFIED BACK LOCATION, UNSPECIFIED BACK PAIN LATERALITY, UNSPECIFIED CHRONICITY: Primary | ICD-10-CM

## 2019-02-15 NOTE — TELEPHONE ENCOUNTER
Returned call to pt. He is requesting further lab testing before he starts pain management and is asking for lab orders for a CBC, Chemo 20, urinalysis lab panel.  Routing encounter.

## 2019-02-15 NOTE — TELEPHONE ENCOUNTER
----- Message from Pam Dave sent at 2/15/2019  9:41 AM CST -----  Contact: patient  Type:  Test Results    Who Called: patient  Name of Test (Lab/Mammo/Etc):Xray  Date of Test: 01/29/19  Ordering Provider: Rayray Pedersen  Where the test was performed: [facility]  Would the patient rather a call back or a response via [facility]?   Best Call Back Number: [phone]  Additional Information:  Patient has a question about his xray results.

## 2019-02-20 ENCOUNTER — TELEPHONE (OUTPATIENT)
Dept: FAMILY MEDICINE | Facility: CLINIC | Age: 69
End: 2019-02-20

## 2019-02-20 ENCOUNTER — LAB VISIT (OUTPATIENT)
Dept: LAB | Facility: HOSPITAL | Age: 69
End: 2019-02-20
Attending: NURSE PRACTITIONER
Payer: COMMERCIAL

## 2019-02-20 DIAGNOSIS — M54.9 BACK PAIN, UNSPECIFIED BACK LOCATION, UNSPECIFIED BACK PAIN LATERALITY, UNSPECIFIED CHRONICITY: ICD-10-CM

## 2019-02-20 LAB
ALBUMIN SERPL BCP-MCNC: 4.3 G/DL
ALP SERPL-CCNC: 56 U/L
ALT SERPL W/O P-5'-P-CCNC: 23 U/L
ANION GAP SERPL CALC-SCNC: 8 MMOL/L
AST SERPL-CCNC: 20 U/L
BASOPHILS # BLD AUTO: 0.03 K/UL
BASOPHILS NFR BLD: 0.7 %
BILIRUB SERPL-MCNC: 0.6 MG/DL
BUN SERPL-MCNC: 14 MG/DL
CALCIUM SERPL-MCNC: 10.1 MG/DL
CHLORIDE SERPL-SCNC: 103 MMOL/L
CO2 SERPL-SCNC: 28 MMOL/L
CREAT SERPL-MCNC: 1.1 MG/DL
DIFFERENTIAL METHOD: NORMAL
EOSINOPHIL # BLD AUTO: 0.3 K/UL
EOSINOPHIL NFR BLD: 7.6 %
ERYTHROCYTE [DISTWIDTH] IN BLOOD BY AUTOMATED COUNT: 12.8 %
EST. GFR  (AFRICAN AMERICAN): >60 ML/MIN/1.73 M^2
EST. GFR  (NON AFRICAN AMERICAN): >60 ML/MIN/1.73 M^2
GLUCOSE SERPL-MCNC: 96 MG/DL
HCT VFR BLD AUTO: 46 %
HGB BLD-MCNC: 14.9 G/DL
IMM GRANULOCYTES # BLD AUTO: 0.01 K/UL
IMM GRANULOCYTES NFR BLD AUTO: 0.2 %
LYMPHOCYTES # BLD AUTO: 1.1 K/UL
LYMPHOCYTES NFR BLD: 26.1 %
MCH RBC QN AUTO: 30 PG
MCHC RBC AUTO-ENTMCNC: 32.4 G/DL
MCV RBC AUTO: 93 FL
MONOCYTES # BLD AUTO: 0.4 K/UL
MONOCYTES NFR BLD: 10 %
NEUTROPHILS # BLD AUTO: 2.3 K/UL
NEUTROPHILS NFR BLD: 55.4 %
NRBC BLD-RTO: 0 /100 WBC
PLATELET # BLD AUTO: 169 K/UL
PMV BLD AUTO: 11.1 FL
POTASSIUM SERPL-SCNC: 4.6 MMOL/L
PROT SERPL-MCNC: 7.3 G/DL
RBC # BLD AUTO: 4.97 M/UL
SODIUM SERPL-SCNC: 139 MMOL/L
WBC # BLD AUTO: 4.21 K/UL

## 2019-02-20 PROCEDURE — 80053 COMPREHEN METABOLIC PANEL: CPT

## 2019-02-20 PROCEDURE — 36415 COLL VENOUS BLD VENIPUNCTURE: CPT | Mod: PO

## 2019-02-20 PROCEDURE — 85025 COMPLETE CBC W/AUTO DIFF WBC: CPT

## 2019-02-20 NOTE — TELEPHONE ENCOUNTER
----- Message from Alisia Vega sent at 2/20/2019  4:35 PM CST -----  Patient returning call..[phone] (home)

## 2019-02-27 ENCOUNTER — OFFICE VISIT (OUTPATIENT)
Dept: FAMILY MEDICINE | Facility: CLINIC | Age: 69
End: 2019-02-27
Payer: COMMERCIAL

## 2019-02-27 VITALS
HEART RATE: 102 BPM | TEMPERATURE: 98 F | SYSTOLIC BLOOD PRESSURE: 140 MMHG | BODY MASS INDEX: 29.35 KG/M2 | HEIGHT: 70 IN | WEIGHT: 205 LBS | DIASTOLIC BLOOD PRESSURE: 77 MMHG

## 2019-02-27 DIAGNOSIS — J06.9 UPPER RESPIRATORY TRACT INFECTION, UNSPECIFIED TYPE: Primary | ICD-10-CM

## 2019-02-27 DIAGNOSIS — M62.830 BACK SPASM: ICD-10-CM

## 2019-02-27 DIAGNOSIS — Z76.0 MEDICATION REFILL: ICD-10-CM

## 2019-02-27 DIAGNOSIS — R05.9 COUGH: ICD-10-CM

## 2019-02-27 PROCEDURE — 1101F PT FALLS ASSESS-DOCD LE1/YR: CPT | Mod: CPTII,S$GLB,, | Performed by: NURSE PRACTITIONER

## 2019-02-27 PROCEDURE — 1101F PR PT FALLS ASSESS DOC 0-1 FALLS W/OUT INJ PAST YR: ICD-10-PCS | Mod: CPTII,S$GLB,, | Performed by: NURSE PRACTITIONER

## 2019-02-27 PROCEDURE — 99999 PR PBB SHADOW E&M-EST. PATIENT-LVL IV: CPT | Mod: PBBFAC,,, | Performed by: NURSE PRACTITIONER

## 2019-02-27 PROCEDURE — 99999 PR PBB SHADOW E&M-EST. PATIENT-LVL IV: ICD-10-PCS | Mod: PBBFAC,,, | Performed by: NURSE PRACTITIONER

## 2019-02-27 PROCEDURE — 99213 OFFICE O/P EST LOW 20 MIN: CPT | Mod: S$GLB,,, | Performed by: NURSE PRACTITIONER

## 2019-02-27 PROCEDURE — 99213 PR OFFICE/OUTPT VISIT, EST, LEVL III, 20-29 MIN: ICD-10-PCS | Mod: S$GLB,,, | Performed by: NURSE PRACTITIONER

## 2019-02-27 RX ORDER — METHYLPREDNISOLONE 4 MG/1
TABLET ORAL
Qty: 1 PACKAGE | Refills: 0 | Status: SHIPPED | OUTPATIENT
Start: 2019-02-27 | End: 2019-03-20

## 2019-02-27 RX ORDER — BENZONATATE 200 MG/1
200 CAPSULE ORAL 3 TIMES DAILY PRN
Qty: 30 CAPSULE | Refills: 0 | Status: SHIPPED | OUTPATIENT
Start: 2019-02-27 | End: 2019-03-09

## 2019-02-27 RX ORDER — CYCLOBENZAPRINE HCL 10 MG
10 TABLET ORAL 3 TIMES DAILY PRN
Qty: 30 TABLET | Refills: 0 | Status: SHIPPED | OUTPATIENT
Start: 2019-02-27 | End: 2019-03-09

## 2019-02-27 RX ORDER — GUAIFENESIN 600 MG/1
1200 TABLET, EXTENDED RELEASE ORAL 2 TIMES DAILY
COMMUNITY

## 2019-02-27 RX ORDER — SULFAMETHOXAZOLE AND TRIMETHOPRIM 800; 160 MG/1; MG/1
1 TABLET ORAL 2 TIMES DAILY
Qty: 12 TABLET | Refills: 0 | Status: SHIPPED | OUTPATIENT
Start: 2019-02-27 | End: 2019-03-05

## 2019-02-27 NOTE — PROGRESS NOTES
Subjective:       Patient ID: Raphael Nelson Jr. is a 68 y.o. male.    Chief Complaint: Sore Throat and Cough    Cough   This is a new problem. The current episode started 1 to 4 weeks ago. The problem has been unchanged. The problem occurs every few minutes. The cough is non-productive. Associated symptoms include nasal congestion, postnasal drip, rhinorrhea and wheezing. Pertinent negatives include no chest pain, chills, ear congestion, ear pain, fever, headaches, heartburn, hemoptysis, myalgias, rash, sore throat, shortness of breath, sweats or weight loss. Nothing aggravates the symptoms. Treatments tried: Pt states began bactrim regimen x 4 d and symptoms have improved; declines CXR. The treatment provided moderate relief. There is no history of asthma, bronchiectasis, bronchitis, COPD, emphysema, environmental allergies or pneumonia.   HR, BP elevated; pt states in pain; states has chronic back pain and sees specialist. Pt requests flexeril refill. Pt has no other complaints today.   Past Medical History:   Diagnosis Date    Allergy     Anxiety     BPH (benign prostatic hyperplasia)     Cataract     Hyperlipidemia 12/5/2017    The cholesterol has been high and the ASCVD score is also high inducing the desire to treat this with mediciations.  Lab Results Component Value Date  CHOL 212 (H) 12/05/2017  CHOL 184 05/14/2015  Lab Results Component Value Date  HDL 54 12/05/2017  HDL 47 05/14/2015  Lab Results Component Value Date  LDLCALC 143.0 12/05/2017  LDLCALC 118.4 05/14/2015  Lab Results Component Value Date  TRIG 75 12/    Rotator cuff disorder      Social History     Socioeconomic History    Marital status:      Spouse name: Cynthia    Number of children: 5    Years of education: Not on file    Highest education level: Not on file   Social Needs    Financial resource strain: Not on file    Food insecurity - worry: Not on file    Food insecurity - inability: Not on file    Transportation  needs - medical: Not on file    Transportation needs - non-medical: Not on file   Occupational History    Not on file   Tobacco Use    Smoking status: Never Smoker    Smokeless tobacco: Never Used   Substance and Sexual Activity    Alcohol use: Yes     Comment: Socially    Drug use: Not on file    Sexual activity: Yes     Partners: Female   Other Topics Concern    Not on file   Social History Narrative    Not on file     Past Surgical History:   Procedure Laterality Date    ADENOIDECTOMY      APPENDECTOMY      COLONOSCOPY  3/1/2013    was normal.    TONSILLECTOMY         Review of Systems   Constitutional: Negative.  Negative for chills, fever and weight loss.   HENT: Positive for postnasal drip and rhinorrhea. Negative for ear pain and sore throat.    Eyes: Negative.    Respiratory: Positive for cough and wheezing. Negative for hemoptysis and shortness of breath.    Cardiovascular: Negative.  Negative for chest pain.   Gastrointestinal: Negative.  Negative for heartburn.   Endocrine: Negative.    Genitourinary: Negative.    Musculoskeletal: Negative.  Negative for myalgias.   Skin: Negative.  Negative for rash.   Allergic/Immunologic: Negative.  Negative for environmental allergies.   Neurological: Negative.  Negative for headaches.   Psychiatric/Behavioral: Negative.        Objective:      Physical Exam   Constitutional: He is oriented to person, place, and time. He appears well-developed and well-nourished.   HENT:   Head: Normocephalic.   Right Ear: Hearing, tympanic membrane, external ear and ear canal normal.   Left Ear: Hearing, tympanic membrane, external ear and ear canal normal.   Nose: Rhinorrhea present. Right sinus exhibits no maxillary sinus tenderness and no frontal sinus tenderness. Left sinus exhibits no maxillary sinus tenderness and no frontal sinus tenderness.   Mouth/Throat: Uvula is midline, oropharynx is clear and moist and mucous membranes are normal.   Eyes: Conjunctivae are  normal. Pupils are equal, round, and reactive to light.   Neck: Normal range of motion. Neck supple.   Cardiovascular: Normal rate, regular rhythm and normal heart sounds.   Pulmonary/Chest: Effort normal and breath sounds normal.   Abdominal: Soft. Bowel sounds are normal.   Musculoskeletal: Normal range of motion.   Neurological: He is alert and oriented to person, place, and time.   Skin: Skin is warm and dry. Capillary refill takes 2 to 3 seconds.   Psychiatric: He has a normal mood and affect. His behavior is normal. Judgment and thought content normal.   Nursing note and vitals reviewed.      Assessment:       1. Upper respiratory tract infection, unspecified type    2. Cough    3. Back spasm    4. Medication refill        Plan:       Raphael was seen today for sore throat and cough.    Diagnoses and all orders for this visit:    Upper respiratory tract infection, unspecified type  Cough  -     benzonatate (TESSALON) 200 MG capsule; Take 1 capsule (200 mg total) by mouth 3 (three) times daily as needed.  -     sulfamethoxazole-trimethoprim 800-160mg (BACTRIM DS) 800-160 mg Tab; Take 1 tablet by mouth 2 (two) times daily. for 6 days  -     methylPREDNISolone (MEDROL DOSEPACK) 4 mg tablet; use as directed    Back spasm  Medication refill  -     cyclobenzaprine (FLEXERIL) 10 MG tablet; Take 1 tablet (10 mg total) by mouth 3 (three) times daily as needed.    Hydrate well  Report to ER immediately if symptoms worsen

## 2019-12-06 ENCOUNTER — OFFICE VISIT (OUTPATIENT)
Dept: FAMILY MEDICINE | Facility: CLINIC | Age: 69
End: 2019-12-06
Payer: COMMERCIAL

## 2019-12-06 VITALS
HEART RATE: 74 BPM | DIASTOLIC BLOOD PRESSURE: 58 MMHG | HEIGHT: 71 IN | WEIGHT: 203 LBS | BODY MASS INDEX: 28.42 KG/M2 | SYSTOLIC BLOOD PRESSURE: 106 MMHG | TEMPERATURE: 98 F

## 2019-12-06 DIAGNOSIS — Z23 FLU VACCINE NEED: ICD-10-CM

## 2019-12-06 DIAGNOSIS — Z79.899 ENCOUNTER FOR LONG-TERM (CURRENT) USE OF MEDICATIONS: ICD-10-CM

## 2019-12-06 DIAGNOSIS — M62.830 SPASM OF LUMBAR PARASPINOUS MUSCLE: Primary | ICD-10-CM

## 2019-12-06 PROBLEM — Z13.6 SCREENING FOR AAA (AORTIC ABDOMINAL ANEURYSM): Status: ACTIVE | Noted: 2019-08-08

## 2019-12-06 PROBLEM — R01.1 SYSTOLIC MURMUR: Status: ACTIVE | Noted: 2019-05-09

## 2019-12-06 PROBLEM — R09.89 BILATERAL CAROTID BRUITS: Status: ACTIVE | Noted: 2019-05-09

## 2019-12-06 PROBLEM — R00.0 SINUS TACHYCARDIA: Status: ACTIVE | Noted: 2019-05-09

## 2019-12-06 PROCEDURE — 90471 IMMUNIZATION ADMIN: CPT | Mod: S$GLB,,, | Performed by: FAMILY MEDICINE

## 2019-12-06 PROCEDURE — 1101F PR PT FALLS ASSESS DOC 0-1 FALLS W/OUT INJ PAST YR: ICD-10-PCS | Mod: CPTII,S$GLB,, | Performed by: FAMILY MEDICINE

## 2019-12-06 PROCEDURE — 99999 PR PBB SHADOW E&M-EST. PATIENT-LVL III: ICD-10-PCS | Mod: PBBFAC,,, | Performed by: FAMILY MEDICINE

## 2019-12-06 PROCEDURE — 90662 IIV NO PRSV INCREASED AG IM: CPT | Mod: S$GLB,,, | Performed by: FAMILY MEDICINE

## 2019-12-06 PROCEDURE — 1159F PR MEDICATION LIST DOCUMENTED IN MEDICAL RECORD: ICD-10-PCS | Mod: S$GLB,,, | Performed by: FAMILY MEDICINE

## 2019-12-06 PROCEDURE — 1101F PT FALLS ASSESS-DOCD LE1/YR: CPT | Mod: CPTII,S$GLB,, | Performed by: FAMILY MEDICINE

## 2019-12-06 PROCEDURE — 99214 OFFICE O/P EST MOD 30 MIN: CPT | Mod: 25,S$GLB,, | Performed by: FAMILY MEDICINE

## 2019-12-06 PROCEDURE — 1125F AMNT PAIN NOTED PAIN PRSNT: CPT | Mod: S$GLB,,, | Performed by: FAMILY MEDICINE

## 2019-12-06 PROCEDURE — 1125F PR PAIN SEVERITY QUANTIFIED, PAIN PRESENT: ICD-10-PCS | Mod: S$GLB,,, | Performed by: FAMILY MEDICINE

## 2019-12-06 PROCEDURE — 90471 FLU VACCINE - HIGH DOSE (65+) PRESERVATIVE FREE IM: ICD-10-PCS | Mod: S$GLB,,, | Performed by: FAMILY MEDICINE

## 2019-12-06 PROCEDURE — 90662 FLU VACCINE - HIGH DOSE (65+) PRESERVATIVE FREE IM: ICD-10-PCS | Mod: S$GLB,,, | Performed by: FAMILY MEDICINE

## 2019-12-06 PROCEDURE — 99999 PR PBB SHADOW E&M-EST. PATIENT-LVL III: CPT | Mod: PBBFAC,,, | Performed by: FAMILY MEDICINE

## 2019-12-06 PROCEDURE — 99214 PR OFFICE/OUTPT VISIT, EST, LEVL IV, 30-39 MIN: ICD-10-PCS | Mod: 25,S$GLB,, | Performed by: FAMILY MEDICINE

## 2019-12-06 PROCEDURE — 1159F MED LIST DOCD IN RCRD: CPT | Mod: S$GLB,,, | Performed by: FAMILY MEDICINE

## 2019-12-06 RX ORDER — CYCLOBENZAPRINE HCL 10 MG
10 TABLET ORAL 3 TIMES DAILY PRN
Qty: 30 TABLET | Refills: 0 | Status: SHIPPED | OUTPATIENT
Start: 2019-12-06 | End: 2019-12-16

## 2019-12-06 RX ORDER — MELOXICAM 15 MG/1
15 TABLET ORAL DAILY
Qty: 30 TABLET | Refills: 3 | Status: SHIPPED | OUTPATIENT
Start: 2019-12-06 | End: 2020-01-05

## 2019-12-06 RX ORDER — METOPROLOL SUCCINATE 25 MG/1
25 TABLET, EXTENDED RELEASE ORAL
COMMUNITY
Start: 2019-05-29 | End: 2020-04-22 | Stop reason: SDUPTHER

## 2019-12-06 RX ORDER — METHYLPREDNISOLONE 4 MG/1
TABLET ORAL
Qty: 21 TABLET | Refills: 0 | Status: SHIPPED | OUTPATIENT
Start: 2019-12-06 | End: 2020-05-22

## 2019-12-06 NOTE — ASSESSMENT & PLAN NOTE
Medications as prescribed.  Offered physical therapy.  Patient advised to try Epson salt in addition to anti-inflammatory medications.Discussed condition course and signs and symptoms to expect.  Patient advised take anti-inflammatories and or Tylenol for pain or fever.  ER precautions.  Call MD or follow-up to clinic if not improving or worsening symptoms.

## 2019-12-06 NOTE — PROGRESS NOTES
PLAN:      Problem List Items Addressed This Visit     Spasm of lumbar paraspinous muscle - Primary (Chronic)     Medications as prescribed.  Offered physical therapy.  Patient advised to try Epson salt in addition to anti-inflammatory medications.Discussed condition course and signs and symptoms to expect.  Patient advised take anti-inflammatories and or Tylenol for pain or fever.  ER precautions.  Call MD or follow-up to clinic if not improving or worsening symptoms.           Relevant Medications    methylPREDNISolone (MEDROL DOSEPACK) 4 mg tablet    cyclobenzaprine (FLEXERIL) 10 MG tablet    meloxicam (MOBIC) 15 MG tablet    Encounter for long-term (current) use of medications (Chronic)     Complete history and physical was completed today.  Complete and thorough medication reconciliation was performed.  Discussed risks and benefits of medications.  Advised patient on orders and health maintenance.  We discussed old records and old labs if available.  Will request any records not available through epic.  Continue current medications listed on your summary sheet.         Flu vaccine need    Relevant Orders    Influenza - High Dose (65+) (PF) (IM) (Completed)             Medication List with Changes/Refills   New Medications    CYCLOBENZAPRINE (FLEXERIL) 10 MG TABLET    Take 1 tablet (10 mg total) by mouth 3 (three) times daily as needed for Muscle spasms.    MELOXICAM (MOBIC) 15 MG TABLET    Take 1 tablet (15 mg total) by mouth once daily.    METHYLPREDNISOLONE (MEDROL DOSEPACK) 4 MG TABLET    follow package directions   Current Medications    ARGININE HCL, L-ARGININE, 1,000 MG TAB    Take by mouth once daily.    B COMPLEX VITAMINS TABLET    Take 1 tablet by mouth once daily.    CHOLECALCIFEROL, VITAMIN D3, (VITAMIN D3) 5,000 UNIT TAB    Take 5,000 Units by mouth once daily.    CIALIS 5 MG TABLET    Take 2.5 mg by mouth daily as needed.     DUTASTERIDE (AVODART) 0.5 MG CAPSULE        GUAIFENESIN (MUCINEX) 600 MG  12 HR TABLET    Take 1,200 mg by mouth 2 (two) times daily.    IBUPROFEN (ADVIL,MOTRIN) 200 MG TABLET    Take 800 mg by mouth 3 (three) times daily.     KRILL  MG CAP    Take by mouth once daily.    METOPROLOL SUCCINATE (TOPROL-XL) 25 MG 24 HR TABLET    Take 25 mg by mouth.    MULTIVITAMIN CAPSULE    Take 1 capsule by mouth once daily.    PRASTERONE, DHEA, 50 MG CAP    Take 50 mg by mouth.    TAMSULOSIN (FLOMAX) 0.4 MG CAP       Discontinued Medications    CICLESONIDE (OMNARIS) 50 MCG SPRY    2 sprays by Each Nare route once daily.       Alex Merritt M.D.     ==========================================================================  Subjective:      Patient ID: Raphael Nelson Jr. is a 69 y.o. male.  has a past medical history of Allergy, Anxiety, BPH (benign prostatic hyperplasia), Cataract, Hyperlipidemia (12/5/2017), and Rotator cuff disorder.     Chief Complaint: Back Pain and Immunizations      Problem List Items Addressed This Visit     Spasm of lumbar paraspinous muscle - Primary (Chronic)    Overview     Acute.  Started few days ago.  Recurrent episode.  Patient had previous episode in January which required steroids and muscle relaxers.  X-rays were performed at that time which showed degenerative change.  Reviewed x-rays.  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  Over-the-counter medications are not helping.  Patient has tried TENS unit in the past which did not help.  Patient denies any trauma         Current Assessment & Plan     Medications as prescribed.  Offered physical therapy.  Patient advised to try Epson salt in addition to anti-inflammatory medications.Discussed condition course and signs and symptoms to expect.  Patient advised take anti-inflammatories and or Tylenol for pain or fever.  ER precautions.  Call MD or follow-up to clinic if not improving or worsening symptoms.           Encounter for long-term (current) use of medications (Chronic)     Overview     CHRONIC. Stable. Compliant with medications for managed conditions. See medication list. No SE reported.   Routine lab analysis is being monitored. Refills were addressed.  Lab Results   Component Value Date    WBC 4.21 02/20/2019    HGB 14.9 02/20/2019    HCT 46.0 02/20/2019    MCV 93 02/20/2019     02/20/2019         Chemistry        Component Value Date/Time     02/20/2019 1011    K 4.6 02/20/2019 1011     02/20/2019 1011    CO2 28 02/20/2019 1011    BUN 14 02/20/2019 1011    CREATININE 1.1 02/20/2019 1011    GLU 96 02/20/2019 1011        Component Value Date/Time    CALCIUM 10.1 02/20/2019 1011    ALKPHOS 56 02/20/2019 1011    AST 20 02/20/2019 1011    ALT 23 02/20/2019 1011    BILITOT 0.6 02/20/2019 1011    ESTGFRAFRICA >60.0 02/20/2019 1011    EGFRNONAA >60.0 02/20/2019 1011          Lab Results   Component Value Date    TSH 2.918 12/05/2017              Current Assessment & Plan     Complete history and physical was completed today.  Complete and thorough medication reconciliation was performed.  Discussed risks and benefits of medications.  Advised patient on orders and health maintenance.  We discussed old records and old labs if available.  Will request any records not available through epic.  Continue current medications listed on your summary sheet.         Flu vaccine need           Past Medical History:  Past Medical History:   Diagnosis Date    Allergy     Anxiety     BPH (benign prostatic hyperplasia)     Cataract     Hyperlipidemia 12/5/2017    The cholesterol has been high and the ASCVD score is also high inducing the desire to treat this with mediciations.  Lab Results Component Value Date  CHOL 212 (H) 12/05/2017  CHOL 184 05/14/2015  Lab Results Component Value Date  HDL 54 12/05/2017  HDL 47 05/14/2015  Lab Results Component Value Date  LDLCALC 143.0 12/05/2017  LDLCALC 118.4 05/14/2015  Lab Results Component Value Date  TRIG 75 12/    Rotator cuff disorder       Past Surgical History:   Procedure Laterality Date    ADENOIDECTOMY      APPENDECTOMY      COLONOSCOPY  3/1/2013    was normal.    TONSILLECTOMY       Review of patient's allergies indicates:   Allergen Reactions    Codeine      Itching    Meperidine Itching     Medication List with Changes/Refills   New Medications    CYCLOBENZAPRINE (FLEXERIL) 10 MG TABLET    Take 1 tablet (10 mg total) by mouth 3 (three) times daily as needed for Muscle spasms.    MELOXICAM (MOBIC) 15 MG TABLET    Take 1 tablet (15 mg total) by mouth once daily.    METHYLPREDNISOLONE (MEDROL DOSEPACK) 4 MG TABLET    follow package directions   Current Medications    ARGININE HCL, L-ARGININE, 1,000 MG TAB    Take by mouth once daily.    B COMPLEX VITAMINS TABLET    Take 1 tablet by mouth once daily.    CHOLECALCIFEROL, VITAMIN D3, (VITAMIN D3) 5,000 UNIT TAB    Take 5,000 Units by mouth once daily.    CIALIS 5 MG TABLET    Take 2.5 mg by mouth daily as needed.     DUTASTERIDE (AVODART) 0.5 MG CAPSULE        GUAIFENESIN (MUCINEX) 600 MG 12 HR TABLET    Take 1,200 mg by mouth 2 (two) times daily.    IBUPROFEN (ADVIL,MOTRIN) 200 MG TABLET    Take 800 mg by mouth 3 (three) times daily.     KRILL  MG CAP    Take by mouth once daily.    METOPROLOL SUCCINATE (TOPROL-XL) 25 MG 24 HR TABLET    Take 25 mg by mouth.    MULTIVITAMIN CAPSULE    Take 1 capsule by mouth once daily.    PRASTERONE, DHEA, 50 MG CAP    Take 50 mg by mouth.    TAMSULOSIN (FLOMAX) 0.4 MG CAP       Discontinued Medications    CICLESONIDE (OMNARIS) 50 MCG SPRY    2 sprays by Each Nare route once daily.      Social History     Tobacco Use    Smoking status: Never Smoker    Smokeless tobacco: Never Used   Substance Use Topics    Alcohol use: Yes     Comment: Socially      Family History   Problem Relation Age of Onset    Heart disease Mother     COPD Mother     Diabetes Father     Heart disease Father        I have reviewed the complete PMH, social history,  "surgical history, allergies and medications.  As well as family history.    Review of Systems   Constitutional: Negative for chills, fatigue, fever and unexpected weight change.   HENT: Negative for ear pain and sore throat.    Eyes: Negative for redness and visual disturbance.   Respiratory: Negative for cough and shortness of breath.    Cardiovascular: Negative for chest pain and palpitations.   Gastrointestinal: Negative for nausea and vomiting.   Endocrine: Negative for cold intolerance and heat intolerance.   Genitourinary: Negative for difficulty urinating and hematuria.   Musculoskeletal: Positive for back pain. Negative for arthralgias and myalgias.   Skin: Negative for rash and wound.   Allergic/Immunologic: Negative for environmental allergies and food allergies.   Neurological: Negative for weakness and headaches.   Hematological: Negative for adenopathy. Does not bruise/bleed easily.   Psychiatric/Behavioral: Negative for sleep disturbance. The patient is not nervous/anxious.      Objective:   BP (!) 106/58   Pulse 74   Temp 97.7 °F (36.5 °C) (Oral)   Ht 5' 11" (1.803 m)   Wt 92.1 kg (203 lb)   BMI 28.31 kg/m²   Physical Exam   Constitutional: He is oriented to person, place, and time. He appears well-developed and well-nourished. No distress.   HENT:   Head: Normocephalic and atraumatic.   Eyes: Pupils are equal, round, and reactive to light. EOM are normal.   Neck: Normal range of motion. Neck supple.   Cardiovascular: Normal rate, regular rhythm, normal heart sounds and intact distal pulses.   No murmur heard.  Pulmonary/Chest: Effort normal and breath sounds normal. No respiratory distress. He has no wheezes.   Musculoskeletal: He exhibits no edema.        Thoracic back: He exhibits tenderness. He exhibits normal range of motion, no bony tenderness, no swelling, no edema, no deformity and no laceration.        Lumbar back: He exhibits decreased range of motion, tenderness, pain and spasm. He " exhibits no bony tenderness, no swelling, no edema, no deformity and no laceration.        Back:    Neurological: He is alert and oriented to person, place, and time. No cranial nerve deficit.   Skin: Skin is warm and dry. Capillary refill takes less than 2 seconds. No rash noted.   Psychiatric: He has a normal mood and affect. His behavior is normal. Thought content normal.   Nursing note and vitals reviewed.      Assessment:     1. Spasm of lumbar paraspinous muscle    2. Flu vaccine need    3. Encounter for long-term (current) use of medications      MDM:     I have Reviewed and summarized old records.  I have performed thorough medication reconciliation today and discussed risk and benefits of each medication.  I have reviewed x-rays and labs and discussed with patient.  All questions were answered.  I am requesting old records and will review them once they are available.    I have signed for the following orders AND/OR meds.  Orders Placed This Encounter   Procedures    Influenza - High Dose (65+) (PF) (IM)     Medications Ordered This Encounter   Medications    cyclobenzaprine (FLEXERIL) 10 MG tablet     Sig: Take 1 tablet (10 mg total) by mouth 3 (three) times daily as needed for Muscle spasms.     Dispense:  30 tablet     Refill:  0    meloxicam (MOBIC) 15 MG tablet     Sig: Take 1 tablet (15 mg total) by mouth once daily.     Dispense:  30 tablet     Refill:  3    methylPREDNISolone (MEDROL DOSEPACK) 4 mg tablet     Sig: follow package directions     Dispense:  21 tablet     Refill:  0        Follow up in about 4 weeks (around 1/3/2020), or if symptoms worsen or fail to improve, for back pain.    If no improvement in symptoms or symptoms worsen, advised to call/follow-up at clinic or go to ER. Patient voiced understanding and all questions/concerns were addressed.     DISCLAIMER: This note was compiled by using a speech recognition dictation system and therefore please be aware that typographical /  speech recognition errors can and do occur.  Please contact me if you see any errors specifically.    Alex Merritt M.D.       Office: [phone] [address] [place] [address]  FAX: [phone]

## 2019-12-06 NOTE — PATIENT INSTRUCTIONS
Follow up in about 4 weeks (around 1/3/2020), or if symptoms worsen or fail to improve, for back pain.     If no improvement in symptoms or symptoms worsen, please be advised to call MD, follow-up at clinic and/or go to ER if becomes severe.    Alex Merritt M.D.         We Offer TELEHEATLH & Same Day Appointments!   Book your Telehealth appointment with me through my nurse or   Clinic appointments on [employer]!    [address] [place] [address]    Office: [phone]     FAX: [phone]    Check out my Facebook Page and Follow Me at: CLICK HERE    Check out my website at [employer] by clicking on: CLICK HERE    To Schedule appointments online, go to [employer]: CLICK HERE     Location: [email or web address]

## 2020-01-28 NOTE — PROGRESS NOTES
Let him know that the 10 year risk of a heart attack is still above the level that we would like it to be and I recommend that we use a statin in addition to the welchol.  A low dose statin added to this could probably get us to less than 10% on the ASCVD risk.  This is the level we usually shoot for.  If he will allow it I recommend 40 mg pravastatin adaily #30 and rf x 2 and recheck the lipid/liver in 3 months and continue the welchol with this.    The 10-year ASCVD risk score (Totzgeo CÁRDENAS Jr., et al., 2013) is: 12.4%    Values used to calculate the score:      Age: 67 years      Sex: Male      Is Non- : No      Diabetic: No      Tobacco smoker: No      Systolic Blood Pressure: 112 mmHg      Is BP treated: No      HDL Cholesterol: 40 mg/dL      Total Cholesterol: 158 mg/dL   DIFFUSE

## 2020-03-09 PROBLEM — Z13.6 SCREENING FOR AAA (AORTIC ABDOMINAL ANEURYSM): Status: RESOLVED | Noted: 2019-08-08 | Resolved: 2020-03-09

## 2020-04-22 RX ORDER — METOPROLOL SUCCINATE 25 MG/1
25 TABLET, EXTENDED RELEASE ORAL DAILY
Qty: 30 TABLET | Refills: 0 | Status: SHIPPED | OUTPATIENT
Start: 2020-04-22 | End: 2020-04-23 | Stop reason: SDUPTHER

## 2020-04-22 NOTE — TELEPHONE ENCOUNTER
The patient is overdue to see me as a provider to maintain me as PCP in the clinic by [facility] because others have been providing care to the patient.  Please arrange for the patient to see me in the near future to update meds/labs and to maintain current patient status.  It if appears to be appropriate, a telehealth visit may be used for this.  I have refilled the requested medicine that prompted this review x 1.

## 2020-04-23 RX ORDER — METOPROLOL SUCCINATE 25 MG/1
25 TABLET, EXTENDED RELEASE ORAL DAILY
Qty: 30 TABLET | Refills: 0 | Status: SHIPPED | OUTPATIENT
Start: 2020-04-23 | End: 2020-05-22 | Stop reason: SDUPTHER

## 2020-04-23 NOTE — TELEPHONE ENCOUNTER
The patient is overdue to see me as a provider to maintain me as PCP in the clinic by [facility] standards because others have been providing care to the patient.  Please arrange for the patient to see me in the near future to update meds/labs and to maintain current patient status.  It if appears to be appropriate, a telehealth visit may be used for this.  I have refilled the requested medicine that prompted this review x 1.

## 2020-05-22 ENCOUNTER — OFFICE VISIT (OUTPATIENT)
Dept: FAMILY MEDICINE | Facility: CLINIC | Age: 70
End: 2020-05-22
Payer: MEDICARE

## 2020-05-22 VITALS
TEMPERATURE: 97 F | HEIGHT: 70 IN | DIASTOLIC BLOOD PRESSURE: 76 MMHG | BODY MASS INDEX: 30.06 KG/M2 | SYSTOLIC BLOOD PRESSURE: 134 MMHG | WEIGHT: 210 LBS | RESPIRATION RATE: 18 BRPM | HEART RATE: 65 BPM

## 2020-05-22 DIAGNOSIS — R00.0 SINUS TACHYCARDIA: ICD-10-CM

## 2020-05-22 DIAGNOSIS — Z13.220 ENCOUNTER FOR LIPID SCREENING FOR CARDIOVASCULAR DISEASE: ICD-10-CM

## 2020-05-22 DIAGNOSIS — Z13.6 ENCOUNTER FOR LIPID SCREENING FOR CARDIOVASCULAR DISEASE: ICD-10-CM

## 2020-05-22 DIAGNOSIS — Z13.1 SCREENING FOR DIABETES MELLITUS: ICD-10-CM

## 2020-05-22 DIAGNOSIS — M62.830 SPASM OF LUMBAR PARASPINOUS MUSCLE: Primary | Chronic | ICD-10-CM

## 2020-05-22 PROCEDURE — 1125F PR PAIN SEVERITY QUANTIFIED, PAIN PRESENT: ICD-10-PCS | Mod: S$GLB,,, | Performed by: FAMILY MEDICINE

## 2020-05-22 PROCEDURE — 1159F MED LIST DOCD IN RCRD: CPT | Mod: S$GLB,,, | Performed by: FAMILY MEDICINE

## 2020-05-22 PROCEDURE — 99214 PR OFFICE/OUTPT VISIT, EST, LEVL IV, 30-39 MIN: ICD-10-PCS | Mod: S$GLB,,, | Performed by: FAMILY MEDICINE

## 2020-05-22 PROCEDURE — 1101F PT FALLS ASSESS-DOCD LE1/YR: CPT | Mod: CPTII,S$GLB,, | Performed by: FAMILY MEDICINE

## 2020-05-22 PROCEDURE — 1125F AMNT PAIN NOTED PAIN PRSNT: CPT | Mod: S$GLB,,, | Performed by: FAMILY MEDICINE

## 2020-05-22 PROCEDURE — 99999 PR PBB SHADOW E&M-EST. PATIENT-LVL III: ICD-10-PCS | Mod: PBBFAC,,, | Performed by: FAMILY MEDICINE

## 2020-05-22 PROCEDURE — 99999 PR PBB SHADOW E&M-EST. PATIENT-LVL III: CPT | Mod: PBBFAC,,, | Performed by: FAMILY MEDICINE

## 2020-05-22 PROCEDURE — 1159F PR MEDICATION LIST DOCUMENTED IN MEDICAL RECORD: ICD-10-PCS | Mod: S$GLB,,, | Performed by: FAMILY MEDICINE

## 2020-05-22 PROCEDURE — 99214 OFFICE O/P EST MOD 30 MIN: CPT | Mod: S$GLB,,, | Performed by: FAMILY MEDICINE

## 2020-05-22 PROCEDURE — 1101F PR PT FALLS ASSESS DOC 0-1 FALLS W/OUT INJ PAST YR: ICD-10-PCS | Mod: CPTII,S$GLB,, | Performed by: FAMILY MEDICINE

## 2020-05-22 RX ORDER — METOPROLOL SUCCINATE 25 MG/1
25 TABLET, EXTENDED RELEASE ORAL DAILY
Qty: 90 TABLET | Refills: 11 | Status: SHIPPED | OUTPATIENT
Start: 2020-05-22 | End: 2020-05-22

## 2020-05-22 RX ORDER — MELOXICAM 15 MG/1
15 TABLET ORAL
Qty: 90 TABLET | Refills: 3 | Status: SHIPPED | OUTPATIENT
Start: 2020-05-22 | End: 2020-05-28 | Stop reason: SDUPTHER

## 2020-05-22 RX ORDER — CYCLOBENZAPRINE HCL 10 MG
25 TABLET ORAL 3 TIMES DAILY PRN
COMMUNITY
End: 2020-05-22 | Stop reason: SDUPTHER

## 2020-05-22 RX ORDER — CYCLOBENZAPRINE HCL 10 MG
10 TABLET ORAL 3 TIMES DAILY PRN
Qty: 30 TABLET | Refills: 11 | Status: SHIPPED | OUTPATIENT
Start: 2020-05-22 | End: 2021-02-03 | Stop reason: SDUPTHER

## 2020-05-22 RX ORDER — MELOXICAM 15 MG/1
1 TABLET ORAL
COMMUNITY
Start: 2020-05-13 | End: 2020-05-22 | Stop reason: SDUPTHER

## 2020-05-22 RX ORDER — METOPROLOL SUCCINATE 25 MG/1
TABLET, EXTENDED RELEASE ORAL
Qty: 90 TABLET | Refills: 3 | Status: SHIPPED | OUTPATIENT
Start: 2020-05-22 | End: 2021-02-03 | Stop reason: SDUPTHER

## 2020-05-22 NOTE — PROGRESS NOTES
Subjective:      Patient ID: Raphael Nelson Jr. is a 69 y.o. male.    Chief Complaint: Annual Exam    Problem List Items Addressed This Visit     Sinus tachycardia    Overview     Patient with complaints of palpitations and tachycardia, presumably paroxysmal atrial fibrillation.  Event monitor showed no evidence of atrial fibrillation, sinus tachycardia as cause of patient's palpitations. Patient started on Toprol-XL 25 mg daily    Last Assessment & Plan:   Patient has had no significant further palpitations on Toprol. Continue current medicine regimen. Okay to decrease aspirin to 81 mg daily         Spasm of lumbar paraspinous muscle - Primary (Chronic)    Overview      Patient had previous episode in January which required steroids and muscle relaxers.  X-rays were performed at that time which showed degenerative change.  Reviewed x-rays.  He has had flexeril and mobic and they help.           Other Visit Diagnoses     Screening for diabetes mellitus        Encounter for lipid screening for cardiovascular disease              Past Medical History:  Past Medical History:   Diagnosis Date    Allergy     Anxiety     BPH (benign prostatic hyperplasia)     Cataract     Hyperlipidemia 12/5/2017    The cholesterol has been high and the ASCVD score is also high inducing the desire to treat this with mediciations.  Lab Results Component Value Date  CHOL 212 (H) 12/05/2017  CHOL 184 05/14/2015  Lab Results Component Value Date  HDL 54 12/05/2017  HDL 47 05/14/2015  Lab Results Component Value Date  LDLCALC 143.0 12/05/2017  LDLCALC 118.4 05/14/2015  Lab Results Component Value Date  TRIG 75 12/    Rotator cuff disorder      Past Surgical History:   Procedure Laterality Date    ADENOIDECTOMY      APPENDECTOMY      COLONOSCOPY  3/1/2013    was normal.    TONSILLECTOMY       Review of patient's allergies indicates:   Allergen Reactions    Codeine      Itching    Meperidine Itching     Current Outpatient  Medications on File Prior to Visit   Medication Sig Dispense Refill    arginine HCl, L-arginine, 1,000 mg Tab Take by mouth once daily.      b complex vitamins tablet Take 1 tablet by mouth once daily.      cholecalciferol, vitamin D3, (VITAMIN D3) 5,000 unit Tab Take 5,000 Units by mouth once daily.      CIALIS 5 mg tablet Take 2.5 mg by mouth daily as needed.       dutasteride (AVODART) 0.5 mg capsule       guaiFENesin (MUCINEX) 600 mg 12 hr tablet Take 1,200 mg by mouth 2 (two) times daily.      ibuprofen (ADVIL,MOTRIN) 200 MG tablet Take 800 mg by mouth 3 (three) times daily.       krill oil 500 mg Cap Take by mouth once daily.      meloxicam (MOBIC) 15 MG tablet Take 1 tablet by mouth as needed.      metoprolol succinate (TOPROL-XL) 25 MG 24 hr tablet Take 1 tablet (25 mg total) by mouth once daily. 30 tablet 0    multivitamin capsule Take 1 capsule by mouth once daily.      prasterone, dhea, 50 mg Cap Take 50 mg by mouth.      tamsulosin (FLOMAX) 0.4 mg Cap       [DISCONTINUED] methylPREDNISolone (MEDROL DOSEPACK) 4 mg tablet follow package directions 21 tablet 0    cyclobenzaprine (FLEXERIL) 10 MG tablet Take 25 mg by mouth 3 (three) times daily as needed for Muscle spasms.       No current facility-administered medications on file prior to visit.      Social History     Socioeconomic History    Marital status:      Spouse name: Cynthia    Number of children: 5    Years of education: Not on file    Highest education level: Not on file   Occupational History    Not on file   Social Needs    Financial resource strain: Not on file    Food insecurity:     Worry: Not on file     Inability: Not on file    Transportation needs:     Medical: Not on file     Non-medical: Not on file   Tobacco Use    Smoking status: Never Smoker    Smokeless tobacco: Never Used   Substance and Sexual Activity    Alcohol use: Yes     Comment: Socially    Drug use: Not on file    Sexual activity: Yes      "Partners: Female   Lifestyle    Physical activity:     Days per week: Not on file     Minutes per session: Not on file    Stress: Not on file   Relationships    Social connections:     Talks on phone: Not on file     Gets together: Not on file     Attends Gnosticist service: Not on file     Active member of club or organization: Not on file     Attends meetings of clubs or organizations: Not on file     Relationship status: Not on file   Other Topics Concern    Not on file   Social History Narrative    Not on file     Family History   Problem Relation Age of Onset    Heart disease Mother     COPD Mother     Diabetes Father     Heart disease Father        Review of Systems   Constitutional: Negative.  Negative for chills, diaphoresis and fever.   HENT: Negative for congestion, hearing loss, mouth sores, postnasal drip and sore throat.    Eyes: Negative for pain and visual disturbance.   Respiratory: Negative for cough, chest tightness, shortness of breath and wheezing.    Cardiovascular: Negative for chest pain.   Gastrointestinal: Negative for abdominal pain, anal bleeding, blood in stool, constipation, diarrhea, nausea and vomiting.   Genitourinary: Negative for dysuria and hematuria.   Musculoskeletal: Negative for back pain, neck pain and neck stiffness.   Skin: Negative for rash.   Neurological: Negative for dizziness and weakness.       Objective:     /76 (BP Location: Left arm, Patient Position: Sitting, BP Method: Medium (Automatic))   Pulse 65   Temp 97.2 °F (36.2 °C)   Resp 18   Ht 5' 10" (1.778 m)   Wt 95.3 kg (210 lb)   BMI 30.13 kg/m²     Physical Exam   Constitutional: He is oriented to person, place, and time. He appears well-developed and well-nourished.   HENT:   Head: Normocephalic and atraumatic.   Right Ear: External ear normal.   Left Ear: External ear normal.   Nose: Nose normal.   Mouth/Throat: Oropharynx is clear and moist. No oropharyngeal exudate.   Eyes: Pupils are " equal, round, and reactive to light. Conjunctivae and EOM are normal. Right eye exhibits no discharge. Left eye exhibits no discharge. No scleral icterus.   Neck: Normal range of motion. Neck supple. No JVD present. No thyromegaly present.   Cardiovascular: Normal rate, regular rhythm, normal heart sounds and intact distal pulses. Exam reveals no gallop and no friction rub.   No murmur heard.  Pulmonary/Chest: Effort normal and breath sounds normal. No respiratory distress. He has no wheezes. He has no rales. He exhibits no tenderness.   Abdominal: Soft. Bowel sounds are normal. He exhibits no distension and no mass. There is no tenderness. There is no rebound and no guarding.   Musculoskeletal: Normal range of motion. He exhibits no edema or tenderness.   Lymphadenopathy:     He has no cervical adenopathy.   Neurological: He is alert and oriented to person, place, and time. No cranial nerve deficit. Coordination normal.   Skin: Skin is warm and dry. He is not diaphoretic.   Psychiatric: He has a normal mood and affect.     Assessment:     1. Spasm of lumbar paraspinous muscle    2. Sinus tachycardia    3. Screening for diabetes mellitus    4. Encounter for lipid screening for cardiovascular disease        Plan:     Problem List Items Addressed This Visit     Sinus tachycardia    Spasm of lumbar paraspinous muscle - Primary (Chronic)      Other Visit Diagnoses     Screening for diabetes mellitus        Encounter for lipid screening for cardiovascular disease            No follow-ups on file.      I have changed Raphael JEAN-BAPTISTE Leslie Silver's meloxicam and cyclobenzaprine. I am also having him maintain his CIALIS, krill oil, arginine HCl (L-arginine), multivitamin, cholecalciferol (vitamin D3), b complex vitamins, tamsulosin, dutasteride, ibuprofen, guaiFENesin, prasterone (dhea), and metoprolol succinate.    Raphael was seen today for annual exam.    Diagnoses and all orders for this visit:    Spasm of lumbar paraspinous  muscle  -     meloxicam (MOBIC) 15 MG tablet; Take 1 tablet (15 mg total) by mouth as needed.  -     cyclobenzaprine (FLEXERIL) 10 MG tablet; Take 1 tablet (10 mg total) by mouth 3 (three) times daily as needed for Muscle spasms.    Sinus tachycardia  -     metoprolol succinate (TOPROL-XL) 25 MG 24 hr tablet; Take 1 tablet (25 mg total) by mouth once daily.    Screening for diabetes mellitus  -     Comprehensive metabolic panel; Future    Encounter for lipid screening for cardiovascular disease  -     Lipid Panel; Future         The patient was instructed to stop the following meds:  Medications Discontinued During This Encounter   Medication Reason    methylPREDNISolone (MEDROL DOSEPACK) 4 mg tablet Patient no longer taking    meloxicam (MOBIC) 15 MG tablet Reorder    cyclobenzaprine (FLEXERIL) 10 MG tablet Reorder    metoprolol succinate (TOPROL-XL) 25 MG 24 hr tablet Reorder     Orders Placed This Encounter   Procedures    Comprehensive metabolic panel     Standing Status:   Future     Standing Expiration Date:   5/22/2021    Lipid Panel     Standing Status:   Future     Standing Expiration Date:   5/22/2021

## 2020-05-28 DIAGNOSIS — M62.830 SPASM OF LUMBAR PARASPINOUS MUSCLE: Chronic | ICD-10-CM

## 2020-05-28 RX ORDER — MELOXICAM 15 MG/1
15 TABLET ORAL
Qty: 90 TABLET | Refills: 3 | Status: SHIPPED | OUTPATIENT
Start: 2020-05-28 | End: 2020-05-29 | Stop reason: SDUPTHER

## 2020-05-29 DIAGNOSIS — M62.830 SPASM OF LUMBAR PARASPINOUS MUSCLE: Chronic | ICD-10-CM

## 2020-05-29 RX ORDER — MELOXICAM 15 MG/1
15 TABLET ORAL
Qty: 90 TABLET | Refills: 3 | Status: SHIPPED | OUTPATIENT
Start: 2020-05-29 | End: 2020-06-01

## 2020-06-01 ENCOUNTER — TELEPHONE (OUTPATIENT)
Dept: FAMILY MEDICINE | Facility: CLINIC | Age: 70
End: 2020-06-01

## 2020-06-01 DIAGNOSIS — M62.830 SPASM OF LUMBAR PARASPINOUS MUSCLE: Chronic | ICD-10-CM

## 2020-06-01 RX ORDER — MELOXICAM 15 MG/1
15 TABLET ORAL
Qty: 90 TABLET | Refills: 3 | Status: SHIPPED | OUTPATIENT
Start: 2020-06-01 | End: 2021-02-03 | Stop reason: SDUPTHER

## 2020-06-01 NOTE — TELEPHONE ENCOUNTER
I have signed for the following orders AND/OR meds.  Please call the patient and ask the patient to schedule the testing AND/OR inform about any medications that were sent.      No orders of the defined types were placed in this encounter.      Medications Ordered This Encounter   Medications    meloxicam (MOBIC) 15 MG tablet     Sig: Take 1 tablet (15 mg total) by mouth as needed for Pain.     Dispense:  90 tablet     Refill:  3

## 2020-06-01 NOTE — TELEPHONE ENCOUNTER
Per fax, from [facility], meloxicam needs a prn frequency, can you send in new script with clarification, please advise.

## 2020-09-08 ENCOUNTER — OFFICE VISIT (OUTPATIENT)
Dept: FAMILY MEDICINE | Facility: CLINIC | Age: 70
End: 2020-09-08
Payer: MEDICARE

## 2020-09-08 DIAGNOSIS — J01.90 ACUTE NON-RECURRENT SINUSITIS, UNSPECIFIED LOCATION: Primary | ICD-10-CM

## 2020-09-08 PROCEDURE — 99999 PR PBB SHADOW E&M-EST. PATIENT-LVL I: ICD-10-PCS | Mod: PBBFAC,,, | Performed by: NURSE PRACTITIONER

## 2020-09-08 PROCEDURE — 1101F PT FALLS ASSESS-DOCD LE1/YR: CPT | Mod: CPTII,S$GLB,, | Performed by: NURSE PRACTITIONER

## 2020-09-08 PROCEDURE — 1159F MED LIST DOCD IN RCRD: CPT | Mod: S$GLB,,, | Performed by: NURSE PRACTITIONER

## 2020-09-08 PROCEDURE — 99213 OFFICE O/P EST LOW 20 MIN: CPT | Mod: S$GLB,,, | Performed by: NURSE PRACTITIONER

## 2020-09-08 PROCEDURE — 99999 PR PBB SHADOW E&M-EST. PATIENT-LVL I: CPT | Mod: PBBFAC,,, | Performed by: NURSE PRACTITIONER

## 2020-09-08 PROCEDURE — 99213 PR OFFICE/OUTPT VISIT, EST, LEVL III, 20-29 MIN: ICD-10-PCS | Mod: S$GLB,,, | Performed by: NURSE PRACTITIONER

## 2020-09-08 PROCEDURE — 1159F PR MEDICATION LIST DOCUMENTED IN MEDICAL RECORD: ICD-10-PCS | Mod: S$GLB,,, | Performed by: NURSE PRACTITIONER

## 2020-09-08 PROCEDURE — 1101F PR PT FALLS ASSESS DOC 0-1 FALLS W/OUT INJ PAST YR: ICD-10-PCS | Mod: CPTII,S$GLB,, | Performed by: NURSE PRACTITIONER

## 2020-09-08 RX ORDER — METHYLPREDNISOLONE 4 MG/1
TABLET ORAL
Qty: 1 PACKAGE | Refills: 0 | Status: SHIPPED | OUTPATIENT
Start: 2020-09-08 | End: 2021-02-03

## 2020-09-08 RX ORDER — AMOXICILLIN 500 MG/1
500 TABLET, FILM COATED ORAL EVERY 12 HOURS
Qty: 20 TABLET | Refills: 0 | Status: SHIPPED | OUTPATIENT
Start: 2020-09-08 | End: 2020-09-18

## 2020-09-08 NOTE — PROGRESS NOTES
Subjective:       Patient ID: Raphael Nelson Jr. is a 70 y.o. male.    Chief Complaint: No chief complaint on file.  Primary Care Telemedicine Note    The patient location is:  Patient Home   The chief complaint leading to consultation is: sinus infection  Total time spent with patient: 8 mins    Visit type: Virtual visit with synchronous audio only and video  Each patient to whom he or she provides medical services by telemedicine is:  (1) informed of the relationship between the physician and patient and the respective role of any other health care provider with respect to management of the patient; and (2) notified that he or she may decline to receive medical services by telemedicine and may withdraw from such care at any time.      Sinus Problem  This is a new problem. The current episode started 1 to 4 weeks ago. The problem has been gradually worsening since onset. There has been no fever. The pain is moderate. Associated symptoms include headaches, sinus pressure and a sore throat. Pertinent negatives include no coughing, ear pain or shortness of breath. (Post nasal drip) Treatments tried: mucinex. The treatment provided no relief.       Review of Systems   Constitutional: Negative for fatigue, fever and unexpected weight change.   HENT: Positive for sinus pressure and sore throat. Negative for ear pain.    Eyes: Negative.  Negative for pain and visual disturbance.   Respiratory: Negative for cough and shortness of breath.    Cardiovascular: Negative for chest pain and palpitations.   Gastrointestinal: Negative for abdominal pain, diarrhea, nausea and vomiting.   Genitourinary: Negative for dysuria and frequency.   Musculoskeletal: Negative for arthralgias and myalgias.   Skin: Negative for color change and rash.   Neurological: Positive for headaches. Negative for dizziness.   Psychiatric/Behavioral: Negative for sleep disturbance. The patient is not nervous/anxious.            Objective:     Current  Outpatient Medications   Medication Sig Dispense Refill    amoxicillin (AMOXIL) 500 MG Tab Take 1 tablet (500 mg total) by mouth every 12 (twelve) hours. for 10 days 20 tablet 0    arginine HCl, L-arginine, 1,000 mg Tab Take by mouth once daily.      b complex vitamins tablet Take 1 tablet by mouth once daily.      cholecalciferol, vitamin D3, (VITAMIN D3) 5,000 unit Tab Take 5,000 Units by mouth once daily.      CIALIS 5 mg tablet Take 2.5 mg by mouth daily as needed.       cyclobenzaprine (FLEXERIL) 10 MG tablet Take 1 tablet (10 mg total) by mouth 3 (three) times daily as needed for Muscle spasms. 30 tablet 11    dutasteride (AVODART) 0.5 mg capsule       guaiFENesin (MUCINEX) 600 mg 12 hr tablet Take 1,200 mg by mouth 2 (two) times daily.      ibuprofen (ADVIL,MOTRIN) 200 MG tablet Take 800 mg by mouth 3 (three) times daily.       krill oil 500 mg Cap Take by mouth once daily.      meloxicam (MOBIC) 15 MG tablet Take 1 tablet (15 mg total) by mouth as needed for Pain. 90 tablet 3    methylPREDNISolone (MEDROL DOSEPACK) 4 mg tablet use as directed 1 Package 0    metoprolol succinate (TOPROL-XL) 25 MG 24 hr tablet TAKE 1 TABLET BY MOUTH ONCE DAILY 90 tablet 3    multivitamin capsule Take 1 capsule by mouth once daily.      prasterone, dhea, 50 mg Cap Take 50 mg by mouth.      tamsulosin (FLOMAX) 0.4 mg Cap        No current facility-administered medications for this visit.        Physical Exam  Constitutional:       Appearance: He is well-developed.   HENT:      Head: Normocephalic.   Neck:      Musculoskeletal: Normal range of motion.   Cardiovascular:      Comments: + loose cough  Pulmonary:      Effort: Pulmonary effort is normal. No respiratory distress.   Neurological:      Mental Status: He is alert and oriented to person, place, and time.   Psychiatric:         Thought Content: Thought content normal.         Judgment: Judgment normal.         Assessment:       1. Acute non-recurrent  sinusitis, unspecified location        Plan:   Acute non-recurrent sinusitis, unspecified location    Other orders  -     methylPREDNISolone (MEDROL DOSEPACK) 4 mg tablet; use as directed  Dispense: 1 Package; Refill: 0  -     amoxicillin (AMOXIL) 500 MG Tab; Take 1 tablet (500 mg total) by mouth every 12 (twelve) hours. for 10 days  Dispense: 20 tablet; Refill: 0        No follow-ups on file.    There are no Patient Instructions on file for this visit.

## 2020-09-23 ENCOUNTER — LAB VISIT (OUTPATIENT)
Dept: LAB | Facility: HOSPITAL | Age: 70
End: 2020-09-23
Attending: FAMILY MEDICINE
Payer: MEDICARE

## 2020-09-23 DIAGNOSIS — Z13.6 ENCOUNTER FOR LIPID SCREENING FOR CARDIOVASCULAR DISEASE: ICD-10-CM

## 2020-09-23 DIAGNOSIS — Z13.220 ENCOUNTER FOR LIPID SCREENING FOR CARDIOVASCULAR DISEASE: ICD-10-CM

## 2020-09-23 DIAGNOSIS — Z13.1 SCREENING FOR DIABETES MELLITUS: ICD-10-CM

## 2020-09-23 PROCEDURE — 36415 COLL VENOUS BLD VENIPUNCTURE: CPT | Mod: PO

## 2020-09-23 PROCEDURE — 80061 LIPID PANEL: CPT

## 2020-09-23 PROCEDURE — 80053 COMPREHEN METABOLIC PANEL: CPT

## 2020-09-24 LAB
ALBUMIN SERPL BCP-MCNC: 4.3 G/DL (ref 3.5–5.2)
ALP SERPL-CCNC: 50 U/L (ref 55–135)
ALT SERPL W/O P-5'-P-CCNC: 26 U/L (ref 10–44)
ANION GAP SERPL CALC-SCNC: 11 MMOL/L (ref 8–16)
AST SERPL-CCNC: 23 U/L (ref 10–40)
BILIRUB SERPL-MCNC: 0.4 MG/DL (ref 0.1–1)
BUN SERPL-MCNC: 18 MG/DL (ref 8–23)
CALCIUM SERPL-MCNC: 9.5 MG/DL (ref 8.7–10.5)
CHLORIDE SERPL-SCNC: 103 MMOL/L (ref 95–110)
CHOLEST SERPL-MCNC: 204 MG/DL (ref 120–199)
CHOLEST/HDLC SERPL: 4.1 {RATIO} (ref 2–5)
CO2 SERPL-SCNC: 26 MMOL/L (ref 23–29)
CREAT SERPL-MCNC: 1.3 MG/DL (ref 0.5–1.4)
EST. GFR  (AFRICAN AMERICAN): >60 ML/MIN/1.73 M^2
EST. GFR  (NON AFRICAN AMERICAN): 54.9 ML/MIN/1.73 M^2
GLUCOSE SERPL-MCNC: 96 MG/DL (ref 70–110)
HDLC SERPL-MCNC: 50 MG/DL (ref 40–75)
HDLC SERPL: 24.5 % (ref 20–50)
LDLC SERPL CALC-MCNC: 135.4 MG/DL (ref 63–159)
NONHDLC SERPL-MCNC: 154 MG/DL
POTASSIUM SERPL-SCNC: 5 MMOL/L (ref 3.5–5.1)
PROT SERPL-MCNC: 7.3 G/DL (ref 6–8.4)
SODIUM SERPL-SCNC: 140 MMOL/L (ref 136–145)
TRIGL SERPL-MCNC: 93 MG/DL (ref 30–150)

## 2020-09-25 DIAGNOSIS — E78.5 HYPERLIPIDEMIA, UNSPECIFIED HYPERLIPIDEMIA TYPE: Primary | ICD-10-CM

## 2020-09-25 RX ORDER — ATORVASTATIN CALCIUM 80 MG/1
80 TABLET, FILM COATED ORAL DAILY
Qty: 90 TABLET | Refills: 3 | Status: SHIPPED | OUTPATIENT
Start: 2020-09-25 | End: 2021-02-03

## 2020-09-25 NOTE — TELEPHONE ENCOUNTER
----- Message from Kurt Davey MD sent at 9/25/2020 11:53 AM CDT -----  The 10 year risk of a heart attack is at 20.9%.  Based on this, I would recommend that we use a statin to drive this risk down.  With your permission, I would start Lipitor 80 mg a day and recheck the lipid in three months.  With this be agreeable?    The electrolytes all look fairly stable and I do not recommend any further workup based on that.      The 10-year ASCVD risk score (Yokogeo CÁRDENAS Jr., et al., 2013) is: 20.9%    Values used to calculate the score:      Age: 70 years      Sex: Male      Is Non- : No      Diabetic: No      Tobacco smoker: No      Systolic Blood Pressure: 134 mmHg      Is BP treated: No      HDL Cholesterol: 50 mg/dL      Total Cholesterol: 204 mg/dL

## 2020-09-25 NOTE — PROGRESS NOTES
The 10 year risk of a heart attack is at 20.9%.  Based on this, I would recommend that we use a statin to drive this risk down.  With your permission, I would start Lipitor 80 mg a day and recheck the lipid in three months.  With this be agreeable?    The electrolytes all look fairly stable and I do not recommend any further workup based on that.      The 10-year ASCVD risk score (Yoko CÁRDENAS Jr., et al., 2013) is: 20.9%    Values used to calculate the score:      Age: 70 years      Sex: Male      Is Non- : No      Diabetic: No      Tobacco smoker: No      Systolic Blood Pressure: 134 mmHg      Is BP treated: No      HDL Cholesterol: 50 mg/dL      Total Cholesterol: 204 mg/dL

## 2021-02-03 ENCOUNTER — OFFICE VISIT (OUTPATIENT)
Dept: FAMILY MEDICINE | Facility: CLINIC | Age: 71
End: 2021-02-03
Payer: MEDICARE

## 2021-02-03 VITALS
HEART RATE: 60 BPM | SYSTOLIC BLOOD PRESSURE: 119 MMHG | WEIGHT: 212 LBS | DIASTOLIC BLOOD PRESSURE: 70 MMHG | RESPIRATION RATE: 18 BRPM | HEIGHT: 70 IN | BODY MASS INDEX: 30.35 KG/M2 | TEMPERATURE: 98 F

## 2021-02-03 DIAGNOSIS — R09.89 BILATERAL CAROTID BRUITS: ICD-10-CM

## 2021-02-03 DIAGNOSIS — M62.830 SPASM OF LUMBAR PARASPINOUS MUSCLE: Chronic | ICD-10-CM

## 2021-02-03 DIAGNOSIS — R00.0 SINUS TACHYCARDIA: ICD-10-CM

## 2021-02-03 DIAGNOSIS — Z12.5 ENCOUNTER FOR PROSTATE CANCER SCREENING: ICD-10-CM

## 2021-02-03 DIAGNOSIS — E78.5 HYPERLIPIDEMIA, UNSPECIFIED HYPERLIPIDEMIA TYPE: ICD-10-CM

## 2021-02-03 DIAGNOSIS — Z91.89 RISK FOR CORONARY ARTERY DISEASE BETWEEN 10% AND 20% IN NEXT 10 YEARS PER FRAMINGHAM SCORE: Primary | ICD-10-CM

## 2021-02-03 PROCEDURE — 3288F PR FALLS RISK ASSESSMENT DOCUMENTED: ICD-10-PCS | Mod: CPTII,S$GLB,, | Performed by: FAMILY MEDICINE

## 2021-02-03 PROCEDURE — 1159F MED LIST DOCD IN RCRD: CPT | Mod: S$GLB,,, | Performed by: FAMILY MEDICINE

## 2021-02-03 PROCEDURE — 99999 PR PBB SHADOW E&M-EST. PATIENT-LVL IV: ICD-10-PCS | Mod: PBBFAC,,, | Performed by: FAMILY MEDICINE

## 2021-02-03 PROCEDURE — 1101F PT FALLS ASSESS-DOCD LE1/YR: CPT | Mod: CPTII,S$GLB,, | Performed by: FAMILY MEDICINE

## 2021-02-03 PROCEDURE — 1159F PR MEDICATION LIST DOCUMENTED IN MEDICAL RECORD: ICD-10-PCS | Mod: S$GLB,,, | Performed by: FAMILY MEDICINE

## 2021-02-03 PROCEDURE — 99214 OFFICE O/P EST MOD 30 MIN: CPT | Mod: S$GLB,,, | Performed by: FAMILY MEDICINE

## 2021-02-03 PROCEDURE — 3288F FALL RISK ASSESSMENT DOCD: CPT | Mod: CPTII,S$GLB,, | Performed by: FAMILY MEDICINE

## 2021-02-03 PROCEDURE — 1101F PR PT FALLS ASSESS DOC 0-1 FALLS W/OUT INJ PAST YR: ICD-10-PCS | Mod: CPTII,S$GLB,, | Performed by: FAMILY MEDICINE

## 2021-02-03 PROCEDURE — 1126F AMNT PAIN NOTED NONE PRSNT: CPT | Mod: S$GLB,,, | Performed by: FAMILY MEDICINE

## 2021-02-03 PROCEDURE — 1126F PR PAIN SEVERITY QUANTIFIED, NO PAIN PRESENT: ICD-10-PCS | Mod: S$GLB,,, | Performed by: FAMILY MEDICINE

## 2021-02-03 PROCEDURE — 99999 PR PBB SHADOW E&M-EST. PATIENT-LVL IV: CPT | Mod: PBBFAC,,, | Performed by: FAMILY MEDICINE

## 2021-02-03 PROCEDURE — 3008F PR BODY MASS INDEX (BMI) DOCUMENTED: ICD-10-PCS | Mod: CPTII,S$GLB,, | Performed by: FAMILY MEDICINE

## 2021-02-03 PROCEDURE — 3008F BODY MASS INDEX DOCD: CPT | Mod: CPTII,S$GLB,, | Performed by: FAMILY MEDICINE

## 2021-02-03 PROCEDURE — 99214 PR OFFICE/OUTPT VISIT, EST, LEVL IV, 30-39 MIN: ICD-10-PCS | Mod: S$GLB,,, | Performed by: FAMILY MEDICINE

## 2021-02-03 RX ORDER — METOPROLOL SUCCINATE 25 MG/1
25 TABLET, EXTENDED RELEASE ORAL DAILY
Qty: 90 TABLET | Refills: 3 | Status: SHIPPED | OUTPATIENT
Start: 2021-02-03 | End: 2021-09-22 | Stop reason: SDUPTHER

## 2021-02-03 RX ORDER — MAGNESIUM 250 MG
TABLET ORAL ONCE
COMMUNITY

## 2021-02-03 RX ORDER — CYCLOBENZAPRINE HCL 10 MG
10 TABLET ORAL 3 TIMES DAILY PRN
Qty: 30 TABLET | Refills: 11 | Status: SHIPPED | OUTPATIENT
Start: 2021-02-03 | End: 2021-12-17 | Stop reason: SDUPTHER

## 2021-02-03 RX ORDER — ASPIRIN 81 MG/1
81 TABLET ORAL DAILY
COMMUNITY
End: 2021-12-17

## 2021-02-03 RX ORDER — MELOXICAM 15 MG/1
15 TABLET ORAL
Qty: 90 TABLET | Refills: 3 | Status: SHIPPED | OUTPATIENT
Start: 2021-02-03 | End: 2021-12-17 | Stop reason: SDUPTHER

## 2021-02-03 RX ORDER — FLUTICASONE PROPIONATE 50 MCG
1 SPRAY, SUSPENSION (ML) NASAL DAILY
COMMUNITY

## 2021-02-03 RX ORDER — COLLAGENASE CLOSTRIDIUM HISTOLYTICUM 0.9 MG
KIT INJECTION
COMMUNITY
Start: 2021-01-27 | End: 2021-12-17

## 2021-02-03 RX ORDER — ATORVASTATIN CALCIUM 20 MG/1
20 TABLET, FILM COATED ORAL DAILY
Qty: 90 TABLET | Refills: 3 | Status: SHIPPED | OUTPATIENT
Start: 2021-02-03 | End: 2021-09-17 | Stop reason: SDUPTHER

## 2021-02-03 RX ORDER — ZINC GLUCONATE 50 MG
50 TABLET ORAL DAILY
COMMUNITY
End: 2023-01-31

## 2021-02-23 ENCOUNTER — PATIENT OUTREACH (OUTPATIENT)
Dept: ADMINISTRATIVE | Facility: HOSPITAL | Age: 71
End: 2021-02-23

## 2021-02-27 NOTE — TELEPHONE ENCOUNTER
----- Message from Paolo Ha sent at 12/7/2017  2:13 PM CST -----  [facility]  
Chart reviewed for [facility]    
No

## 2021-09-17 RX ORDER — ATORVASTATIN CALCIUM 20 MG/1
20 TABLET, FILM COATED ORAL DAILY
Qty: 90 TABLET | Refills: 3 | Status: SHIPPED | OUTPATIENT
Start: 2021-09-17 | End: 2021-09-22 | Stop reason: SDUPTHER

## 2021-09-22 DIAGNOSIS — R00.0 SINUS TACHYCARDIA: ICD-10-CM

## 2021-09-22 RX ORDER — ATORVASTATIN CALCIUM 20 MG/1
20 TABLET, FILM COATED ORAL DAILY
Qty: 90 TABLET | Refills: 3 | Status: SHIPPED | OUTPATIENT
Start: 2021-09-22 | End: 2021-12-17 | Stop reason: SDUPTHER

## 2021-09-22 RX ORDER — METOPROLOL SUCCINATE 25 MG/1
25 TABLET, EXTENDED RELEASE ORAL DAILY
Qty: 90 TABLET | Refills: 3 | Status: SHIPPED | OUTPATIENT
Start: 2021-09-22 | End: 2021-12-17 | Stop reason: SDUPTHER

## 2021-11-18 ENCOUNTER — TELEPHONE (OUTPATIENT)
Dept: FAMILY MEDICINE | Facility: CLINIC | Age: 71
End: 2021-11-18
Payer: MEDICARE

## 2021-11-18 ENCOUNTER — OFFICE VISIT (OUTPATIENT)
Dept: FAMILY MEDICINE | Facility: CLINIC | Age: 71
End: 2021-11-18
Payer: MEDICARE

## 2021-11-18 DIAGNOSIS — Z03.89 NO DIAGNOSIS OR CONDITION ON AXIS I: Primary | ICD-10-CM

## 2021-11-18 PROCEDURE — 99499 UNLISTED E&M SERVICE: CPT | Mod: 95,,, | Performed by: NURSE PRACTITIONER

## 2021-11-18 PROCEDURE — 99499 NO LOS: ICD-10-PCS | Mod: 95,,, | Performed by: NURSE PRACTITIONER

## 2021-11-19 ENCOUNTER — TELEPHONE (OUTPATIENT)
Dept: FAMILY MEDICINE | Facility: CLINIC | Age: 71
End: 2021-11-19
Payer: MEDICARE

## 2021-11-19 ENCOUNTER — PATIENT MESSAGE (OUTPATIENT)
Dept: FAMILY MEDICINE | Facility: CLINIC | Age: 71
End: 2021-11-19
Payer: MEDICARE

## 2021-11-19 RX ORDER — AZITHROMYCIN 250 MG/1
TABLET, FILM COATED ORAL
Qty: 6 TABLET | Refills: 0 | Status: SHIPPED | OUTPATIENT
Start: 2021-11-19 | End: 2021-12-17

## 2021-11-19 RX ORDER — METHYLPREDNISOLONE 4 MG/1
TABLET ORAL
Qty: 21 TABLET | Refills: 0 | Status: SHIPPED | OUTPATIENT
Start: 2021-11-19 | End: 2021-12-17

## 2021-11-23 ENCOUNTER — TELEPHONE (OUTPATIENT)
Dept: ADMINISTRATIVE | Facility: HOSPITAL | Age: 71
End: 2021-11-23
Payer: MEDICARE

## 2021-12-17 ENCOUNTER — HOSPITAL ENCOUNTER (OUTPATIENT)
Dept: RADIOLOGY | Facility: HOSPITAL | Age: 71
Discharge: HOME | End: 2021-12-17
Attending: FAMILY MEDICINE
Payer: MEDICARE

## 2021-12-17 ENCOUNTER — OFFICE VISIT (OUTPATIENT)
Dept: FAMILY MEDICINE | Facility: CLINIC | Age: 71
End: 2021-12-17
Payer: MEDICARE

## 2021-12-17 VITALS
HEART RATE: 64 BPM | WEIGHT: 208 LBS | BODY MASS INDEX: 29.78 KG/M2 | TEMPERATURE: 98 F | SYSTOLIC BLOOD PRESSURE: 111 MMHG | HEIGHT: 70 IN | DIASTOLIC BLOOD PRESSURE: 68 MMHG

## 2021-12-17 DIAGNOSIS — D72.119 HYPEREOSINOPHILIC SYNDROME, UNSPECIFIED TYPE: ICD-10-CM

## 2021-12-17 DIAGNOSIS — E78.5 HYPERLIPIDEMIA, UNSPECIFIED HYPERLIPIDEMIA TYPE: ICD-10-CM

## 2021-12-17 DIAGNOSIS — Z12.5 ENCOUNTER FOR PROSTATE CANCER SCREENING: ICD-10-CM

## 2021-12-17 DIAGNOSIS — Z12.11 COLON CANCER SCREENING: ICD-10-CM

## 2021-12-17 DIAGNOSIS — M62.830 SPASM OF LUMBAR PARASPINOUS MUSCLE: Chronic | ICD-10-CM

## 2021-12-17 DIAGNOSIS — R00.0 SINUS TACHYCARDIA: ICD-10-CM

## 2021-12-17 DIAGNOSIS — R05.3 CHRONIC COUGH: ICD-10-CM

## 2021-12-17 DIAGNOSIS — Z87.898 HISTORY OF ELEVATED PSA: ICD-10-CM

## 2021-12-17 DIAGNOSIS — N40.0 BENIGN PROSTATIC HYPERPLASIA, UNSPECIFIED WHETHER LOWER URINARY TRACT SYMPTOMS PRESENT: ICD-10-CM

## 2021-12-17 DIAGNOSIS — Z91.89 RISK FOR CORONARY ARTERY DISEASE BETWEEN 10% AND 20% IN NEXT 10 YEARS PER FRAMINGHAM SCORE: ICD-10-CM

## 2021-12-17 DIAGNOSIS — R05.3 CHRONIC COUGH: Primary | ICD-10-CM

## 2021-12-17 PROCEDURE — 99999 PR PBB SHADOW E&M-EST. PATIENT-LVL III: ICD-10-PCS | Mod: PBBFAC,,, | Performed by: FAMILY MEDICINE

## 2021-12-17 PROCEDURE — 99214 OFFICE O/P EST MOD 30 MIN: CPT | Mod: S$GLB,,, | Performed by: FAMILY MEDICINE

## 2021-12-17 PROCEDURE — 99499 UNLISTED E&M SERVICE: CPT | Mod: S$GLB,,, | Performed by: FAMILY MEDICINE

## 2021-12-17 PROCEDURE — 99499 RISK ADDL DX/OHS AUDIT: ICD-10-PCS | Mod: S$GLB,,, | Performed by: FAMILY MEDICINE

## 2021-12-17 PROCEDURE — 71046 X-RAY EXAM CHEST 2 VIEWS: CPT | Mod: TC,PO

## 2021-12-17 PROCEDURE — 99214 PR OFFICE/OUTPT VISIT, EST, LEVL IV, 30-39 MIN: ICD-10-PCS | Mod: S$GLB,,, | Performed by: FAMILY MEDICINE

## 2021-12-17 PROCEDURE — 71046 XR CHEST PA AND LATERAL: ICD-10-PCS | Mod: 26,,, | Performed by: RADIOLOGY

## 2021-12-17 PROCEDURE — 99999 PR PBB SHADOW E&M-EST. PATIENT-LVL III: CPT | Mod: PBBFAC,,, | Performed by: FAMILY MEDICINE

## 2021-12-17 PROCEDURE — 71046 X-RAY EXAM CHEST 2 VIEWS: CPT | Mod: 26,,, | Performed by: RADIOLOGY

## 2021-12-17 RX ORDER — CYCLOBENZAPRINE HCL 10 MG
10 TABLET ORAL 3 TIMES DAILY PRN
Qty: 30 TABLET | Refills: 11 | Status: SHIPPED | OUTPATIENT
Start: 2021-12-17 | End: 2022-12-15 | Stop reason: SDUPTHER

## 2021-12-17 RX ORDER — AZITHROMYCIN 250 MG/1
TABLET, FILM COATED ORAL
Qty: 6 TABLET | Refills: 0 | Status: SHIPPED | OUTPATIENT
Start: 2021-12-17 | End: 2023-01-31

## 2021-12-17 RX ORDER — TADALAFIL 5 MG/1
2.5 TABLET ORAL DAILY PRN
Qty: 90 TABLET | Refills: 3 | Status: SHIPPED | OUTPATIENT
Start: 2021-12-17 | End: 2024-03-27 | Stop reason: SDUPTHER

## 2021-12-17 RX ORDER — TAMSULOSIN HYDROCHLORIDE 0.4 MG/1
0.4 CAPSULE ORAL DAILY
Qty: 90 CAPSULE | Refills: 3 | Status: SHIPPED | OUTPATIENT
Start: 2021-12-17 | End: 2023-12-27

## 2021-12-17 RX ORDER — ATORVASTATIN CALCIUM 20 MG/1
20 TABLET, FILM COATED ORAL DAILY
Qty: 90 TABLET | Refills: 3 | Status: SHIPPED | OUTPATIENT
Start: 2021-12-17 | End: 2023-01-27

## 2021-12-17 RX ORDER — MELOXICAM 15 MG/1
15 TABLET ORAL
Qty: 90 TABLET | Refills: 3 | Status: SHIPPED | OUTPATIENT
Start: 2021-12-17 | End: 2022-12-15 | Stop reason: SDUPTHER

## 2021-12-17 RX ORDER — SODIUM, POTASSIUM,MAG SULFATES 17.5-3.13G
SOLUTION, RECONSTITUTED, ORAL ORAL
Qty: 354 ML | Refills: 0 | Status: ON HOLD | OUTPATIENT
Start: 2021-12-17 | End: 2023-02-14 | Stop reason: ALTCHOICE

## 2021-12-17 RX ORDER — PENTOXIFYLLINE 400 MG/1
TABLET, EXTENDED RELEASE ORAL
COMMUNITY
Start: 2021-09-22

## 2021-12-17 RX ORDER — METOPROLOL SUCCINATE 25 MG/1
25 TABLET, EXTENDED RELEASE ORAL DAILY
Qty: 90 TABLET | Refills: 3 | Status: SHIPPED | OUTPATIENT
Start: 2021-12-17 | End: 2022-12-14

## 2021-12-17 RX ORDER — DUTASTERIDE 0.5 MG/1
0.5 CAPSULE, LIQUID FILLED ORAL DAILY
Qty: 90 CAPSULE | Refills: 3 | Status: SHIPPED | OUTPATIENT
Start: 2021-12-17 | End: 2024-01-26 | Stop reason: SDUPTHER

## 2021-12-18 LAB
ALBUMIN SERPL-MCNC: 4.5 G/DL (ref 3.7–4.7)
ALBUMIN/GLOB SERPL: 1.8 {RATIO} (ref 1.2–2.2)
ALP SERPL-CCNC: 62 IU/L (ref 44–121)
ALT SERPL-CCNC: 28 IU/L (ref 0–44)
AST SERPL-CCNC: 24 IU/L (ref 0–40)
BASOPHILS # BLD AUTO: 0 X10E3/UL (ref 0–0.2)
BASOPHILS NFR BLD AUTO: 1 %
BILIRUB SERPL-MCNC: 0.5 MG/DL (ref 0–1.2)
BUN SERPL-MCNC: 15 MG/DL (ref 8–27)
BUN/CREAT SERPL: 14 (ref 10–24)
CALCIUM SERPL-MCNC: 9.6 MG/DL (ref 8.6–10.2)
CHLORIDE SERPL-SCNC: 101 MMOL/L (ref 96–106)
CHOLEST SERPL-MCNC: 138 MG/DL (ref 100–199)
CO2 SERPL-SCNC: 25 MMOL/L (ref 20–29)
CREAT SERPL-MCNC: 1.08 MG/DL (ref 0.76–1.27)
EOSINOPHIL # BLD AUTO: 0.2 X10E3/UL (ref 0–0.4)
EOSINOPHIL NFR BLD AUTO: 4 %
ERYTHROCYTE [DISTWIDTH] IN BLOOD BY AUTOMATED COUNT: 13 % (ref 11.6–15.4)
GLOBULIN SER CALC-MCNC: 2.5 G/DL (ref 1.5–4.5)
GLUCOSE SERPL-MCNC: 89 MG/DL (ref 65–99)
HCT VFR BLD AUTO: 44.2 % (ref 37.5–51)
HDLC SERPL-MCNC: 53 MG/DL
HGB BLD-MCNC: 14.6 G/DL (ref 13–17.7)
IMM GRANULOCYTES # BLD AUTO: 0 X10E3/UL (ref 0–0.1)
IMM GRANULOCYTES NFR BLD AUTO: 0 %
LDLC SERPL CALC-MCNC: 68 MG/DL (ref 0–99)
LYMPHOCYTES # BLD AUTO: 1.5 X10E3/UL (ref 0.7–3.1)
LYMPHOCYTES NFR BLD AUTO: 31 %
MCH RBC QN AUTO: 30.4 PG (ref 26.6–33)
MCHC RBC AUTO-ENTMCNC: 33 G/DL (ref 31.5–35.7)
MCV RBC AUTO: 92 FL (ref 79–97)
MONOCYTES # BLD AUTO: 0.7 X10E3/UL (ref 0.1–0.9)
MONOCYTES NFR BLD AUTO: 15 %
NEUTROPHILS # BLD AUTO: 2.4 X10E3/UL (ref 1.4–7)
NEUTROPHILS NFR BLD AUTO: 49 %
PLATELET # BLD AUTO: 168 X10E3/UL (ref 150–450)
POTASSIUM SERPL-SCNC: 4.9 MMOL/L (ref 3.5–5.2)
PROT SERPL-MCNC: 7 G/DL (ref 6–8.5)
PSA SERPL-MCNC: 0.4 NG/ML (ref 0–4)
RBC # BLD AUTO: 4.8 X10E6/UL (ref 4.14–5.8)
SODIUM SERPL-SCNC: 138 MMOL/L (ref 134–144)
TRIGL SERPL-MCNC: 89 MG/DL (ref 0–149)
VLDLC SERPL CALC-MCNC: 17 MG/DL (ref 5–40)
WBC # BLD AUTO: 4.8 X10E3/UL (ref 3.4–10.8)

## 2021-12-23 ENCOUNTER — TELEPHONE (OUTPATIENT)
Dept: ENDOSCOPY | Facility: HOSPITAL | Age: 71
End: 2021-12-23
Payer: MEDICARE

## 2021-12-23 NOTE — TELEPHONE ENCOUNTER
The patient has not seen his results. Please send him a letter on his results if you cannot contact him.

## 2022-01-28 ENCOUNTER — PATIENT OUTREACH (OUTPATIENT)
Dept: ADMINISTRATIVE | Facility: HOSPITAL | Age: 72
End: 2022-01-28
Payer: MEDICARE

## 2022-01-31 ENCOUNTER — PES CALL (OUTPATIENT)
Dept: ADMINISTRATIVE | Facility: CLINIC | Age: 72
End: 2022-01-31
Payer: MEDICARE

## 2022-03-29 ENCOUNTER — PES CALL (OUTPATIENT)
Dept: ADMINISTRATIVE | Facility: CLINIC | Age: 72
End: 2022-03-29
Payer: MEDICARE

## 2022-04-18 DIAGNOSIS — Z12.11 ENCOUNTER FOR SCREENING COLONOSCOPY: Primary | ICD-10-CM

## 2022-05-26 ENCOUNTER — PATIENT MESSAGE (OUTPATIENT)
Dept: FAMILY MEDICINE | Facility: CLINIC | Age: 72
End: 2022-05-26
Payer: MEDICARE

## 2022-06-07 ENCOUNTER — PES CALL (OUTPATIENT)
Dept: ADMINISTRATIVE | Facility: CLINIC | Age: 72
End: 2022-06-07
Payer: MEDICARE

## 2022-06-10 ENCOUNTER — PES CALL (OUTPATIENT)
Dept: ADMINISTRATIVE | Facility: CLINIC | Age: 72
End: 2022-06-10
Payer: MEDICARE

## 2022-07-08 ENCOUNTER — PATIENT MESSAGE (OUTPATIENT)
Dept: FAMILY MEDICINE | Facility: CLINIC | Age: 72
End: 2022-07-08
Payer: MEDICARE

## 2022-07-26 ENCOUNTER — TELEPHONE (OUTPATIENT)
Dept: FAMILY MEDICINE | Facility: CLINIC | Age: 72
End: 2022-07-26
Payer: MEDICARE

## 2022-07-26 NOTE — TELEPHONE ENCOUNTER
----- Message from Renetta Shagufta sent at 7/26/2022 10:06 AM CDT -----  Contact: [facility]  Type:  Pharmacy Calling to Clarify an RX    Name of Caller: Radha  Pharmacy Name:[facility]  Prescription Name: meloxicam (MOBIC) 15 MG tablet       What do they need to clarify?:Clarification max daily of prescription, if needing to take 1 daily for pain.   Best Call Back Number: [phone]  Additional Information: Pharmacy request a call back when possible.  Thank you,  GH

## 2022-07-29 ENCOUNTER — PES CALL (OUTPATIENT)
Dept: ADMINISTRATIVE | Facility: CLINIC | Age: 72
End: 2022-07-29
Payer: MEDICARE

## 2022-07-29 ENCOUNTER — TELEPHONE (OUTPATIENT)
Dept: FAMILY MEDICINE | Facility: CLINIC | Age: 72
End: 2022-07-29
Payer: MEDICARE

## 2022-07-29 NOTE — TELEPHONE ENCOUNTER
----- Message from Beverly Wilder sent at 7/29/2022 10:02 AM CDT -----  Contact: [facility]  ..Type:  Pharmacy Calling to Clarify an RX    Name of Caller:Adriane  Pharmacy Name:[facility]  Prescription Name:meloxicam (MOBIC) 15 MG tablet  What do they need to clarify?: Directions   Best Call Back Number:[phone]  Additional Information:

## 2022-10-17 ENCOUNTER — PES CALL (OUTPATIENT)
Dept: ADMINISTRATIVE | Facility: CLINIC | Age: 72
End: 2022-10-17
Payer: MEDICARE

## 2022-11-04 ENCOUNTER — PES CALL (OUTPATIENT)
Dept: ADMINISTRATIVE | Facility: CLINIC | Age: 72
End: 2022-11-04
Payer: MEDICARE

## 2022-12-05 ENCOUNTER — TELEPHONE (OUTPATIENT)
Dept: PREADMISSION TESTING | Facility: HOSPITAL | Age: 72
End: 2022-12-05
Payer: MEDICARE

## 2022-12-05 ENCOUNTER — PATIENT MESSAGE (OUTPATIENT)
Dept: PREADMISSION TESTING | Facility: HOSPITAL | Age: 72
End: 2022-12-05
Payer: MEDICARE

## 2022-12-06 ENCOUNTER — TELEPHONE (OUTPATIENT)
Dept: PREADMISSION TESTING | Facility: HOSPITAL | Age: 72
End: 2022-12-06
Payer: MEDICARE

## 2022-12-15 ENCOUNTER — LAB VISIT (OUTPATIENT)
Dept: LAB | Facility: HOSPITAL | Age: 72
End: 2022-12-15
Attending: NURSE PRACTITIONER
Payer: MEDICARE

## 2022-12-15 ENCOUNTER — OFFICE VISIT (OUTPATIENT)
Dept: FAMILY MEDICINE | Facility: CLINIC | Age: 72
End: 2022-12-15
Payer: MEDICARE

## 2022-12-15 VITALS
TEMPERATURE: 98 F | DIASTOLIC BLOOD PRESSURE: 69 MMHG | HEIGHT: 71 IN | WEIGHT: 215.63 LBS | SYSTOLIC BLOOD PRESSURE: 124 MMHG | BODY MASS INDEX: 30.19 KG/M2 | HEART RATE: 58 BPM

## 2022-12-15 DIAGNOSIS — E78.5 HYPERLIPIDEMIA, UNSPECIFIED HYPERLIPIDEMIA TYPE: ICD-10-CM

## 2022-12-15 DIAGNOSIS — E29.8 OTHER TESTICULAR DYSFUNCTION: ICD-10-CM

## 2022-12-15 DIAGNOSIS — E66.9 OBESITY, UNSPECIFIED: ICD-10-CM

## 2022-12-15 DIAGNOSIS — N40.0 BENIGN PROSTATIC HYPERPLASIA, UNSPECIFIED WHETHER LOWER URINARY TRACT SYMPTOMS PRESENT: ICD-10-CM

## 2022-12-15 DIAGNOSIS — Z12.5 PROSTATE CANCER SCREENING: ICD-10-CM

## 2022-12-15 DIAGNOSIS — Z12.11 COLON CANCER SCREENING: ICD-10-CM

## 2022-12-15 DIAGNOSIS — Z76.0 MEDICATION REFILL: ICD-10-CM

## 2022-12-15 DIAGNOSIS — R68.89 OTHER GENERAL SYMPTOMS AND SIGNS: ICD-10-CM

## 2022-12-15 DIAGNOSIS — Z00.00 ANNUAL PHYSICAL EXAM: ICD-10-CM

## 2022-12-15 DIAGNOSIS — Z91.89 RISK FOR CORONARY ARTERY DISEASE BETWEEN 10% AND 20% IN NEXT 10 YEARS PER FRAMINGHAM SCORE: ICD-10-CM

## 2022-12-15 DIAGNOSIS — Z23 NEED FOR PROPHYLACTIC VACCINATION AND INOCULATION AGAINST INFLUENZA: ICD-10-CM

## 2022-12-15 DIAGNOSIS — Z00.00 ANNUAL PHYSICAL EXAM: Primary | ICD-10-CM

## 2022-12-15 DIAGNOSIS — Z79.899 ENCOUNTER FOR LONG-TERM (CURRENT) USE OF MEDICATIONS: Chronic | ICD-10-CM

## 2022-12-15 DIAGNOSIS — Z87.898 HISTORY OF ELEVATED PSA: ICD-10-CM

## 2022-12-15 DIAGNOSIS — K59.09 OTHER CONSTIPATION: ICD-10-CM

## 2022-12-15 DIAGNOSIS — M62.830 SPASM OF LUMBAR PARASPINOUS MUSCLE: Chronic | ICD-10-CM

## 2022-12-15 DIAGNOSIS — R01.1 SYSTOLIC MURMUR: ICD-10-CM

## 2022-12-15 PROBLEM — E66.3 OVERWEIGHT: Status: ACTIVE | Noted: 2022-12-15

## 2022-12-15 LAB
ALBUMIN SERPL BCP-MCNC: 4.3 G/DL (ref 3.5–5.2)
ALP SERPL-CCNC: 51 U/L (ref 55–135)
ALT SERPL W/O P-5'-P-CCNC: 26 U/L (ref 10–44)
ANION GAP SERPL CALC-SCNC: 9 MMOL/L (ref 8–16)
AST SERPL-CCNC: 22 U/L (ref 10–40)
BILIRUB SERPL-MCNC: 0.6 MG/DL (ref 0.1–1)
BUN SERPL-MCNC: 19 MG/DL (ref 8–23)
CALCIUM SERPL-MCNC: 9.7 MG/DL (ref 8.7–10.5)
CHLORIDE SERPL-SCNC: 104 MMOL/L (ref 95–110)
CHOLEST SERPL-MCNC: 157 MG/DL (ref 120–199)
CHOLEST/HDLC SERPL: 3.4 {RATIO} (ref 2–5)
CO2 SERPL-SCNC: 25 MMOL/L (ref 23–29)
COMPLEXED PSA SERPL-MCNC: 0.39 NG/ML (ref 0–4)
CREAT SERPL-MCNC: 1 MG/DL (ref 0.5–1.4)
ERYTHROCYTE [DISTWIDTH] IN BLOOD BY AUTOMATED COUNT: 13 % (ref 11.5–14.5)
EST. GFR  (NO RACE VARIABLE): >60 ML/MIN/1.73 M^2
GLUCOSE SERPL-MCNC: 95 MG/DL (ref 70–110)
HCT VFR BLD AUTO: 45.3 % (ref 40–54)
HDLC SERPL-MCNC: 46 MG/DL (ref 40–75)
HDLC SERPL: 29.3 % (ref 20–50)
HGB BLD-MCNC: 14.7 G/DL (ref 14–18)
LDLC SERPL CALC-MCNC: 92.2 MG/DL (ref 63–159)
MCH RBC QN AUTO: 30.4 PG (ref 27–31)
MCHC RBC AUTO-ENTMCNC: 32.5 G/DL (ref 32–36)
MCV RBC AUTO: 94 FL (ref 82–98)
NONHDLC SERPL-MCNC: 111 MG/DL
PLATELET # BLD AUTO: 171 K/UL (ref 150–450)
PMV BLD AUTO: 11.1 FL (ref 9.2–12.9)
POTASSIUM SERPL-SCNC: 4.3 MMOL/L (ref 3.5–5.1)
PROT SERPL-MCNC: 7.1 G/DL (ref 6–8.4)
RBC # BLD AUTO: 4.84 M/UL (ref 4.6–6.2)
SODIUM SERPL-SCNC: 138 MMOL/L (ref 136–145)
TESTOST SERPL-MCNC: 580 NG/DL (ref 304–1227)
TRIGL SERPL-MCNC: 94 MG/DL (ref 30–150)
TSH SERPL DL<=0.005 MIU/L-ACNC: 3.68 UIU/ML (ref 0.4–4)
WBC # BLD AUTO: 4.61 K/UL (ref 3.9–12.7)

## 2022-12-15 PROCEDURE — 99397 PR PREVENTIVE VISIT,EST,65 & OVER: ICD-10-PCS | Mod: GZ,S$GLB,, | Performed by: NURSE PRACTITIONER

## 2022-12-15 PROCEDURE — 3074F PR MOST RECENT SYSTOLIC BLOOD PRESSURE < 130 MM HG: ICD-10-PCS | Mod: CPTII,S$GLB,, | Performed by: NURSE PRACTITIONER

## 2022-12-15 PROCEDURE — 1125F PR PAIN SEVERITY QUANTIFIED, PAIN PRESENT: ICD-10-PCS | Mod: CPTII,S$GLB,, | Performed by: NURSE PRACTITIONER

## 2022-12-15 PROCEDURE — 84153 ASSAY OF PSA TOTAL: CPT | Performed by: NURSE PRACTITIONER

## 2022-12-15 PROCEDURE — 3008F PR BODY MASS INDEX (BMI) DOCUMENTED: ICD-10-PCS | Mod: CPTII,S$GLB,, | Performed by: NURSE PRACTITIONER

## 2022-12-15 PROCEDURE — 1160F RVW MEDS BY RX/DR IN RCRD: CPT | Mod: CPTII,S$GLB,, | Performed by: NURSE PRACTITIONER

## 2022-12-15 PROCEDURE — 3008F BODY MASS INDEX DOCD: CPT | Mod: CPTII,S$GLB,, | Performed by: NURSE PRACTITIONER

## 2022-12-15 PROCEDURE — 3074F SYST BP LT 130 MM HG: CPT | Mod: CPTII,S$GLB,, | Performed by: NURSE PRACTITIONER

## 2022-12-15 PROCEDURE — 90694 VACC AIIV4 NO PRSRV 0.5ML IM: CPT | Mod: S$GLB,,, | Performed by: NURSE PRACTITIONER

## 2022-12-15 PROCEDURE — 1159F PR MEDICATION LIST DOCUMENTED IN MEDICAL RECORD: ICD-10-PCS | Mod: CPTII,S$GLB,, | Performed by: NURSE PRACTITIONER

## 2022-12-15 PROCEDURE — 80061 LIPID PANEL: CPT | Performed by: NURSE PRACTITIONER

## 2022-12-15 PROCEDURE — 3078F DIAST BP <80 MM HG: CPT | Mod: CPTII,S$GLB,, | Performed by: NURSE PRACTITIONER

## 2022-12-15 PROCEDURE — 1101F PR PT FALLS ASSESS DOC 0-1 FALLS W/OUT INJ PAST YR: ICD-10-PCS | Mod: CPTII,S$GLB,, | Performed by: NURSE PRACTITIONER

## 2022-12-15 PROCEDURE — 1160F PR REVIEW ALL MEDS BY PRESCRIBER/CLIN PHARMACIST DOCUMENTED: ICD-10-PCS | Mod: CPTII,S$GLB,, | Performed by: NURSE PRACTITIONER

## 2022-12-15 PROCEDURE — 99397 PER PM REEVAL EST PAT 65+ YR: CPT | Mod: GZ,S$GLB,, | Performed by: NURSE PRACTITIONER

## 2022-12-15 PROCEDURE — G0008 ADMIN INFLUENZA VIRUS VAC: HCPCS | Mod: S$GLB,,, | Performed by: NURSE PRACTITIONER

## 2022-12-15 PROCEDURE — 85027 COMPLETE CBC AUTOMATED: CPT | Performed by: NURSE PRACTITIONER

## 2022-12-15 PROCEDURE — 3078F PR MOST RECENT DIASTOLIC BLOOD PRESSURE < 80 MM HG: ICD-10-PCS | Mod: CPTII,S$GLB,, | Performed by: NURSE PRACTITIONER

## 2022-12-15 PROCEDURE — 99999 PR PBB SHADOW E&M-EST. PATIENT-LVL V: CPT | Mod: PBBFAC,,, | Performed by: NURSE PRACTITIONER

## 2022-12-15 PROCEDURE — 3288F FALL RISK ASSESSMENT DOCD: CPT | Mod: CPTII,S$GLB,, | Performed by: NURSE PRACTITIONER

## 2022-12-15 PROCEDURE — 1159F MED LIST DOCD IN RCRD: CPT | Mod: CPTII,S$GLB,, | Performed by: NURSE PRACTITIONER

## 2022-12-15 PROCEDURE — 36415 COLL VENOUS BLD VENIPUNCTURE: CPT | Mod: PO | Performed by: NURSE PRACTITIONER

## 2022-12-15 PROCEDURE — 1125F AMNT PAIN NOTED PAIN PRSNT: CPT | Mod: CPTII,S$GLB,, | Performed by: NURSE PRACTITIONER

## 2022-12-15 PROCEDURE — 84443 ASSAY THYROID STIM HORMONE: CPT | Performed by: NURSE PRACTITIONER

## 2022-12-15 PROCEDURE — 3288F PR FALLS RISK ASSESSMENT DOCUMENTED: ICD-10-PCS | Mod: CPTII,S$GLB,, | Performed by: NURSE PRACTITIONER

## 2022-12-15 PROCEDURE — 90694 FLU VACCINE - QUADRIVALENT - ADJUVANTED: ICD-10-PCS | Mod: S$GLB,,, | Performed by: NURSE PRACTITIONER

## 2022-12-15 PROCEDURE — 84403 ASSAY OF TOTAL TESTOSTERONE: CPT | Performed by: NURSE PRACTITIONER

## 2022-12-15 PROCEDURE — 99999 PR PBB SHADOW E&M-EST. PATIENT-LVL V: ICD-10-PCS | Mod: PBBFAC,,, | Performed by: NURSE PRACTITIONER

## 2022-12-15 PROCEDURE — G0008 FLU VACCINE - QUADRIVALENT - ADJUVANTED: ICD-10-PCS | Mod: S$GLB,,, | Performed by: NURSE PRACTITIONER

## 2022-12-15 PROCEDURE — 80053 COMPREHEN METABOLIC PANEL: CPT | Performed by: NURSE PRACTITIONER

## 2022-12-15 PROCEDURE — 1101F PT FALLS ASSESS-DOCD LE1/YR: CPT | Mod: CPTII,S$GLB,, | Performed by: NURSE PRACTITIONER

## 2022-12-15 RX ORDER — MELOXICAM 15 MG/1
15 TABLET ORAL
Qty: 90 TABLET | Refills: 3 | Status: SHIPPED | OUTPATIENT
Start: 2022-12-15 | End: 2023-03-16 | Stop reason: SDUPTHER

## 2022-12-15 RX ORDER — METOPROLOL SUCCINATE 25 MG/1
25 TABLET, EXTENDED RELEASE ORAL DAILY
Qty: 90 TABLET | Refills: 3 | Status: SHIPPED | OUTPATIENT
Start: 2022-12-15 | End: 2023-05-26 | Stop reason: SDUPTHER

## 2022-12-15 RX ORDER — CYCLOBENZAPRINE HCL 10 MG
10 TABLET ORAL 3 TIMES DAILY PRN
Qty: 30 TABLET | Refills: 11 | Status: SHIPPED | OUTPATIENT
Start: 2022-12-15 | End: 2024-03-27 | Stop reason: SDUPTHER

## 2022-12-15 NOTE — PROGRESS NOTES
Subjective:       Patient ID: Raphael Nelson Jr. is a 72 y.o. male.    Chief Complaint: Annual Exam  Pt in today for annual exam. Hyperlipidemia, BPH managed with medication. Pt sees urology for BPH, testicular dysfunction management; states prostate exam UTD. Pt sees cardiology for cardiac murmur, history of sinus tachycardia. Chronic muscle spasms to lower back managed with muscle relaxer PRN. Healthy diet, weight loss recommended. Pt states has intermittent constipation; resolves with increased H20 intake. Labs, colonoscopy, influenza vaccine due. Pt has no other complaints today.  Past Medical History:   Diagnosis Date    Allergy     Anxiety     BPH (benign prostatic hyperplasia)     Cataract     Hyperlipidemia 12/5/2017    The cholesterol has been high and the ASCVD score is also high inducing the desire to treat this with mediciations.  Lab Results Component Value Date  CHOL 212 (H) 12/05/2017  CHOL 184 05/14/2015  Lab Results Component Value Date  HDL 54 12/05/2017  HDL 47 05/14/2015  Lab Results Component Value Date  LDLCALC 143.0 12/05/2017  LDLCALC 118.4 05/14/2015  Lab Results Component Value Date  TRIG 75 12/    Rotator cuff disorder      Social History     Socioeconomic History    Marital status:      Spouse name: Cynthia    Number of children: 5   Tobacco Use    Smoking status: Never    Smokeless tobacco: Never   Substance and Sexual Activity    Alcohol use: Yes     Comment: Socially    Drug use: Never    Sexual activity: Yes     Partners: Female     Past Surgical History:   Procedure Laterality Date    ADENOIDECTOMY      APPENDECTOMY      COLONOSCOPY  3/1/2013    was normal.    TONSILLECTOMY         HPI  Review of Systems   Constitutional: Negative.    HENT: Negative.     Eyes: Negative.    Respiratory: Negative.     Cardiovascular: Negative.    Gastrointestinal: Negative.    Endocrine: Negative.    Genitourinary: Negative.    Musculoskeletal: Negative.    Integumentary:  Negative.    Allergic/Immunologic: Negative.    Neurological: Negative.    Psychiatric/Behavioral: Negative.         Objective:      Physical Exam  Vitals and nursing note reviewed.   Constitutional:       Appearance: Normal appearance.   HENT:      Head: Normocephalic.      Right Ear: Tympanic membrane, ear canal and external ear normal.      Left Ear: Tympanic membrane, ear canal and external ear normal.      Nose: Nose normal.      Mouth/Throat:      Mouth: Mucous membranes are moist.      Pharynx: Oropharynx is clear.   Eyes:      Conjunctiva/sclera: Conjunctivae normal.      Pupils: Pupils are equal, round, and reactive to light.   Cardiovascular:      Rate and Rhythm: Normal rate and regular rhythm.      Pulses: Normal pulses.      Heart sounds: Normal heart sounds.   Pulmonary:      Effort: Pulmonary effort is normal.      Breath sounds: Normal breath sounds.   Abdominal:      General: Bowel sounds are normal.      Palpations: Abdomen is soft.   Musculoskeletal:         General: Normal range of motion.   Skin:     General: Skin is warm and dry.      Capillary Refill: Capillary refill takes 2 to 3 seconds.   Neurological:      Mental Status: He is alert and oriented to person, place, and time.   Psychiatric:         Mood and Affect: Mood normal.         Behavior: Behavior normal.         Thought Content: Thought content normal.         Judgment: Judgment normal.       Assessment:       Problem List Items Addressed This Visit       Spasm of lumbar paraspinous muscle (Chronic)    Relevant Medications    meloxicam (MOBIC) 15 MG tablet    cyclobenzaprine (FLEXERIL) 10 MG tablet    Encounter for long-term (current) use of medications (Chronic)    Relevant Orders    Case Request Endoscopy: COLONOSCOPY (Completed)    BPH (benign prostatic hyperplasia)    Relevant Orders    Case Request Endoscopy: COLONOSCOPY (Completed)    History of elevated PSA    Relevant Orders    Case Request Endoscopy: COLONOSCOPY (Completed)     Hyperlipidemia    Relevant Orders    Case Request Endoscopy: COLONOSCOPY (Completed)    Sinus tachycardia    Relevant Medications    metoprolol succinate (TOPROL-XL) 25 MG 24 hr tablet    Systolic murmur    Relevant Orders    Case Request Endoscopy: COLONOSCOPY (Completed)    Risk for coronary artery disease between 10% and 20% in next 10 years per Novi score    Relevant Orders    Case Request Endoscopy: COLONOSCOPY (Completed)     Other Visit Diagnoses       Annual physical exam    -  Primary    Relevant Orders    CBC Without Differential    Comprehensive Metabolic Panel    TSH    Lipid Panel    PSA, Screening    Testosterone    Need for prophylactic vaccination and inoculation against influenza        Relevant Orders    Flu Vaccine - Quadrivalent (Adjuvanted) *Preferred* 65+ (Completed)    Colon cancer screening        Relevant Orders    Case Request Endoscopy: COLONOSCOPY (Completed)    Prostate cancer screening        Relevant Orders    PSA, Screening    Case Request Endoscopy: COLONOSCOPY (Completed)    Other testicular dysfunction        Relevant Orders    Testosterone    Case Request Endoscopy: COLONOSCOPY (Completed)    Obesity, unspecified        Relevant Orders    Lipid Panel    Case Request Endoscopy: COLONOSCOPY (Completed)    Other general symptoms and signs        Relevant Orders    TSH    Case Request Endoscopy: COLONOSCOPY (Completed)    Other constipation        Relevant Orders    Case Request Endoscopy: COLONOSCOPY (Completed)              Plan:           Raphael was seen today for annual exam.    Diagnoses and all orders for this visit:    Annual physical exam  Need for prophylactic vaccination and inoculation against influenza  Colon cancer screening  Hyperlipidemia, unspecified hyperlipidemia type  Benign prostatic hyperplasia, unspecified whether lower urinary tract symptoms present  History of elevated PSA  Risk for coronary artery disease between 10% and 20% in next 10 years per  Dunnellon score  Systolic murmur  Encounter for long-term (current) use of medications  Prostate cancer screening  Other testicular dysfunction  Obesity, unspecified  Other general symptoms and signs  Other constipation  Spasm of lumbar paraspinous muscle  Medication refill  -     Flu Vaccine - Quadrivalent (Adjuvanted) *Preferred* 65+  -     CBC Without Differential; Future  -     Comprehensive Metabolic Panel; Future  -     TSH; Future  -     Lipid Panel; Future  -     PSA, Screening; Future  -     Testosterone; Future  -     Case Request Endoscopy: COLONOSCOPY        -     meloxicam (MOBIC) 15 MG tablet; Take 1 tablet (15 mg total) by mouth as needed for Pain.  -     cyclobenzaprine (FLEXERIL) 10 MG tablet; Take 1 tablet (10 mg total) by mouth 3 (three) times daily as needed for Muscle spasms.        -     metoprolol succinate (TOPROL-XL) 25 MG 24 hr tablet; Take 1 tablet (25 mg total) by mouth once daily.  Continue current plan of care  F/U with specialists, PCP as advised  RTC as needed  Report to ER immediately if symptoms worsen or persist

## 2022-12-15 NOTE — PATIENT INSTRUCTIONS
Continue current plan of care  F/U with specialists, PCP as advised  RTC as needed  Report to ER immediately if symptoms worsen or persist

## 2022-12-16 ENCOUNTER — TELEPHONE (OUTPATIENT)
Dept: PREADMISSION TESTING | Facility: HOSPITAL | Age: 72
End: 2022-12-16
Payer: MEDICARE

## 2022-12-16 NOTE — TELEPHONE ENCOUNTER
----- Message from Catalina Deleon sent at 12/15/2022  5:10 PM CST -----  Regarding: colonscopy  Contact: patient  Patient is requesting a call concerning his colonoscopy, he would like to have it done on   12/21/2022....contact number is [phone]-please call at 9am if possible

## 2022-12-23 ENCOUNTER — PATIENT MESSAGE (OUTPATIENT)
Dept: FAMILY MEDICINE | Facility: CLINIC | Age: 72
End: 2022-12-23
Payer: MEDICARE

## 2022-12-23 DIAGNOSIS — Z12.11 COLON CANCER SCREENING: Primary | ICD-10-CM

## 2022-12-27 DIAGNOSIS — M62.830 SPASM OF LUMBAR PARASPINOUS MUSCLE: Chronic | ICD-10-CM

## 2022-12-28 RX ORDER — CYCLOBENZAPRINE HCL 10 MG
10 TABLET ORAL 3 TIMES DAILY PRN
Qty: 30 TABLET | Refills: 11 | OUTPATIENT
Start: 2022-12-28

## 2023-01-24 ENCOUNTER — HOSPITAL ENCOUNTER (OUTPATIENT)
Dept: PREADMISSION TESTING | Facility: HOSPITAL | Age: 73
Discharge: HOME | End: 2023-01-24
Attending: FAMILY MEDICINE
Payer: MEDICARE

## 2023-01-24 ENCOUNTER — TELEPHONE (OUTPATIENT)
Dept: ADMINISTRATIVE | Facility: HOSPITAL | Age: 73
End: 2023-01-24
Payer: MEDICARE

## 2023-01-24 DIAGNOSIS — Z12.11 COLON CANCER SCREENING: ICD-10-CM

## 2023-01-31 ENCOUNTER — OFFICE VISIT (OUTPATIENT)
Dept: FAMILY MEDICINE | Facility: CLINIC | Age: 73
End: 2023-01-31
Payer: MEDICARE

## 2023-01-31 DIAGNOSIS — J06.9 UPPER RESPIRATORY TRACT INFECTION, UNSPECIFIED TYPE: Primary | ICD-10-CM

## 2023-01-31 DIAGNOSIS — R05.9 COUGH, UNSPECIFIED TYPE: ICD-10-CM

## 2023-01-31 PROCEDURE — 1160F RVW MEDS BY RX/DR IN RCRD: CPT | Mod: CPTII,95,, | Performed by: NURSE PRACTITIONER

## 2023-01-31 PROCEDURE — 99213 PR OFFICE/OUTPT VISIT, EST, LEVL III, 20-29 MIN: ICD-10-PCS | Mod: 95,,, | Performed by: NURSE PRACTITIONER

## 2023-01-31 PROCEDURE — 1159F MED LIST DOCD IN RCRD: CPT | Mod: CPTII,95,, | Performed by: NURSE PRACTITIONER

## 2023-01-31 PROCEDURE — 1159F PR MEDICATION LIST DOCUMENTED IN MEDICAL RECORD: ICD-10-PCS | Mod: CPTII,95,, | Performed by: NURSE PRACTITIONER

## 2023-01-31 PROCEDURE — 1160F PR REVIEW ALL MEDS BY PRESCRIBER/CLIN PHARMACIST DOCUMENTED: ICD-10-PCS | Mod: CPTII,95,, | Performed by: NURSE PRACTITIONER

## 2023-01-31 PROCEDURE — 99213 OFFICE O/P EST LOW 20 MIN: CPT | Mod: 95,,, | Performed by: NURSE PRACTITIONER

## 2023-01-31 RX ORDER — METHYLPREDNISOLONE 4 MG/1
TABLET ORAL
Qty: 21 TABLET | Refills: 0 | Status: ON HOLD | OUTPATIENT
Start: 2023-01-31 | End: 2023-02-14 | Stop reason: ALTCHOICE

## 2023-01-31 RX ORDER — CEFDINIR 300 MG/1
300 CAPSULE ORAL 2 TIMES DAILY
Qty: 20 CAPSULE | Refills: 0 | Status: SHIPPED | OUTPATIENT
Start: 2023-01-31 | End: 2023-02-10

## 2023-01-31 RX ORDER — BENZONATATE 200 MG/1
200 CAPSULE ORAL 3 TIMES DAILY PRN
Qty: 30 CAPSULE | Refills: 0 | Status: SHIPPED | OUTPATIENT
Start: 2023-01-31 | End: 2023-02-10

## 2023-01-31 NOTE — PATIENT INSTRUCTIONS
Hydrate well  Rest  COVID-19/influenza testing recommended  Report to ER immediately if symptoms worsen or persist

## 2023-01-31 NOTE — PROGRESS NOTES
"Subjective:       Patient ID: Raphael Nelson Jr. is a 72 y.o. male.    Chief Complaint: No chief complaint on file.  The patient location is: [place]  The chief complaint leading to consultation is: cough, nasal congestion    Visit type: audiovisual    Face to Face time with patient: 15 min  minutes of total time spent on the encounter, which includes face to face time and non-face to face time preparing to see the patient (eg, review of tests), Obtaining and/or reviewing separately obtained history, Documenting clinical information in the electronic or other health record, Independently interpreting results (not separately reported) and communicating results to the patient/family/caregiver, or Care coordination (not separately reported).         Each patient to whom he or she provides medical services by telemedicine is:  (1) informed of the relationship between the physician and patient and the respective role of any other health care provider with respect to management of the patient; and (2) notified that he or she may decline to receive medical services by telemedicine and may withdraw from such care at any time.    Notes:     Cough  This is a new problem. The current episode started 1 to 4 weeks ago. The problem has been gradually worsening. The problem occurs every few minutes. The cough is Productive of brown sputum. Associated symptoms include nasal congestion, postnasal drip, rhinorrhea and wheezing. Pertinent negatives include no chest pain, chills, ear congestion, ear pain, fever, headaches, heartburn, hemoptysis, myalgias, rash, sore throat, shortness of breath, sweats or weight loss. The symptoms are aggravated by lying down. He has tried a beta-agonist inhaler and prescription cough suppressant (Declines COVID-19/influenza testing; states, "i get this every year. I know what this is. I just get a steroid and abx.") for the symptoms. The treatment provided no relief. His past medical history is " significant for asthma, bronchitis and environmental allergies. There is no history of bronchiectasis, COPD, emphysema or pneumonia.   Past Medical History:   Diagnosis Date    Allergy     Anxiety     BPH (benign prostatic hyperplasia)     Cataract     Hyperlipidemia 12/5/2017    The cholesterol has been high and the ASCVD score is also high inducing the desire to treat this with mediciations.  Lab Results Component Value Date  CHOL 212 (H) 12/05/2017  CHOL 184 05/14/2015  Lab Results Component Value Date  HDL 54 12/05/2017  HDL 47 05/14/2015  Lab Results Component Value Date  LDLCALC 143.0 12/05/2017  LDLCALC 118.4 05/14/2015  Lab Results Component Value Date  TRIG 75 12/    Rotator cuff disorder      Social History     Socioeconomic History    Marital status:      Spouse name: Cynthia    Number of children: 5   Tobacco Use    Smoking status: Never    Smokeless tobacco: Never   Substance and Sexual Activity    Alcohol use: Yes     Comment: Socially    Drug use: Never    Sexual activity: Yes     Partners: Female     Past Surgical History:   Procedure Laterality Date    ADENOIDECTOMY      APPENDECTOMY      COLONOSCOPY  3/1/2013    was normal.    TONSILLECTOMY         Review of Systems   Constitutional: Negative.  Negative for chills, fever and weight loss.   HENT:  Positive for postnasal drip and rhinorrhea. Negative for ear pain and sore throat.    Eyes: Negative.    Respiratory:  Positive for cough and wheezing. Negative for hemoptysis and shortness of breath.    Cardiovascular:  Negative for chest pain.   Gastrointestinal: Negative.  Negative for heartburn.   Endocrine: Negative.    Genitourinary: Negative.    Musculoskeletal: Negative.  Negative for myalgias.   Integumentary:  Negative for rash. Negative.   Allergic/Immunologic: Positive for environmental allergies.   Neurological: Negative.  Negative for headaches.   Psychiatric/Behavioral: Negative.         Objective:      Physical  Exam  Constitutional:       Appearance: Normal appearance.   Neurological:      Mental Status: He is alert.       Assessment:       Problem List Items Addressed This Visit    None  Visit Diagnoses       Upper respiratory tract infection, unspecified type    -  Primary    Cough, unspecified type                  Plan:           Diagnoses and all orders for this visit:    Upper respiratory tract infection, unspecified type  Cough, unspecified type        -     benzonatate (TESSALON) 200 MG capsule; Take 1 capsule (200 mg total) by mouth 3 (three) times daily as needed.  -     methylPREDNISolone (MEDROL DOSEPACK) 4 mg tablet; use as directed  -     cefdinir (OMNICEF) 300 MG capsule; Take 1 capsule (300 mg total) by mouth 2 (two) times daily. for 10 days  Hydrate well  Rest  COVID-19/influenza testing recommended  Report to ER immediately if symptoms worsen or persist

## 2023-02-01 ENCOUNTER — PES CALL (OUTPATIENT)
Dept: ADMINISTRATIVE | Facility: OTHER | Age: 73
End: 2023-02-01
Payer: MEDICARE

## 2023-02-02 ENCOUNTER — PES CALL (OUTPATIENT)
Dept: ADMINISTRATIVE | Facility: CLINIC | Age: 73
End: 2023-02-02
Payer: MEDICARE

## 2023-02-09 NOTE — PROGRESS NOTES
Procedure instructions reviewed. Place, time, begin low fiber diet, clear liquids only on day before procedure no solid food at all, nothing to eat or drink after 5 am dose of prep on am of procedure, no chewing gum or sucking on candy, take BP medication with sip of water at least 2 hours after am dose of prep on am of procedure, do not take any other medications on am of procedure, have someone to drive them home and bowel prep instructions reviewed with pt. Pt verbalized understanding.

## 2023-02-14 ENCOUNTER — ANESTHESIA (OUTPATIENT)
Dept: ENDOSCOPY | Facility: HOSPITAL | Age: 73
End: 2023-02-14
Payer: MEDICARE

## 2023-02-14 ENCOUNTER — ANESTHESIA EVENT (OUTPATIENT)
Dept: ENDOSCOPY | Facility: HOSPITAL | Age: 73
End: 2023-02-14
Payer: MEDICARE

## 2023-02-14 ENCOUNTER — HOSPITAL ENCOUNTER (OUTPATIENT)
Facility: HOSPITAL | Age: 73
Discharge: HOME | End: 2023-02-14
Attending: FAMILY MEDICINE | Admitting: FAMILY MEDICINE
Payer: MEDICARE

## 2023-02-14 DIAGNOSIS — K63.5 POLYP OF COLON, UNSPECIFIED PART OF COLON, UNSPECIFIED TYPE: ICD-10-CM

## 2023-02-14 DIAGNOSIS — Z12.11 COLON CANCER SCREENING: Primary | ICD-10-CM

## 2023-02-14 PROCEDURE — 37000008 HC ANESTHESIA 1ST 15 MINUTES: Performed by: FAMILY MEDICINE

## 2023-02-14 PROCEDURE — 88305 TISSUE EXAM BY PATHOLOGIST: CPT | Performed by: PATHOLOGY

## 2023-02-14 PROCEDURE — 45385 COLONOSCOPY W/LESION REMOVAL: CPT | Mod: PT,,, | Performed by: FAMILY MEDICINE

## 2023-02-14 PROCEDURE — 88305 TISSUE EXAM BY PATHOLOGIST: CPT | Mod: 26,,, | Performed by: PATHOLOGY

## 2023-02-14 PROCEDURE — 27201089 HC SNARE, DISP (ANY): Performed by: FAMILY MEDICINE

## 2023-02-14 PROCEDURE — 45385 PR COLONOSCOPY,REMV LESN,SNARE: ICD-10-PCS | Mod: PT,,, | Performed by: FAMILY MEDICINE

## 2023-02-14 PROCEDURE — 25000003 PHARM REV CODE 250: Performed by: FAMILY MEDICINE

## 2023-02-14 PROCEDURE — 63600175 PHARM REV CODE 636 W HCPCS: Performed by: NURSE ANESTHETIST, CERTIFIED REGISTERED

## 2023-02-14 PROCEDURE — 37000009 HC ANESTHESIA EA ADD 15 MINS: Performed by: FAMILY MEDICINE

## 2023-02-14 PROCEDURE — 88305 TISSUE EXAM BY PATHOLOGIST: ICD-10-PCS | Mod: 26,,, | Performed by: PATHOLOGY

## 2023-02-14 PROCEDURE — 45385 COLONOSCOPY W/LESION REMOVAL: CPT | Mod: PT | Performed by: FAMILY MEDICINE

## 2023-02-14 RX ORDER — DEXTROMETHORPHAN/PSEUDOEPHED 2.5-7.5/.8
DROPS ORAL
Status: DISCONTINUED | OUTPATIENT
Start: 2023-02-14 | End: 2023-02-14 | Stop reason: HOSPADM

## 2023-02-14 RX ORDER — PROPOFOL 10 MG/ML
VIAL (ML) INTRAVENOUS
Status: DISCONTINUED | OUTPATIENT
Start: 2023-02-14 | End: 2023-02-14

## 2023-02-14 RX ADMIN — PROPOFOL 40 MG: 10 INJECTION, EMULSION INTRAVENOUS at 11:02

## 2023-02-14 RX ADMIN — PROPOFOL 80 MG: 10 INJECTION, EMULSION INTRAVENOUS at 11:02

## 2023-02-14 RX ADMIN — SODIUM CHLORIDE, POTASSIUM CHLORIDE, SODIUM LACTATE AND CALCIUM CHLORIDE: 600; 310; 30; 20 INJECTION, SOLUTION INTRAVENOUS at 11:02

## 2023-02-14 NOTE — TRANSFER OF CARE
"Anesthesia Transfer of Care Note    Patient: Raphael Nelson Jr.    Procedure(s) Performed: Procedure(s) (LRB):  COLONOSCOPY (N/A)    Patient location: GI    Anesthesia Type: MAC    Transport from OR: Transported from OR on room air with adequate spontaneous ventilation    Post pain: adequate analgesia    Post assessment: no apparent anesthetic complications and tolerated procedure well    Post vital signs: stable    Level of consciousness: awake and alert    Nausea/Vomiting: no nausea/vomiting    Complications: none    Transfer of care protocol was followed      Last vitals:   Visit Vitals  BP (!) 151/67 (BP Location: Left arm, Patient Position: Lying)   Pulse 68   Temp 36.8 °C (98.2 °F) (Temporal)   Resp 18   Ht 5' 10.5" (1.791 m)   Wt 97.5 kg (215 lb)   SpO2 99%   BMI 30.41 kg/m²     "

## 2023-02-14 NOTE — ANESTHESIA PREPROCEDURE EVALUATION
02/14/2023  Raphael Nelson Jr. is a 72 y.o., male.      Pre-op Assessment    I have reviewed the Patient Summary Reports.     I have reviewed the Nursing Notes. I have reviewed the NPO Status.      Review of Systems  Cardiovascular:   Hypertension hyperlipidemia    Pulmonary:   Pneumonia COPD Asthma Recent URI    Hepatic/GI:   Bowel Prep.    Musculoskeletal:   Arthritis         Physical Exam  General: Well nourished, Cooperative and Alert    Airway:  Mallampati: II   Mouth Opening: Normal  Tongue: Normal  Neck ROM: Normal ROM    Dental:  Intact        Anesthesia Plan  Type of Anesthesia, risks & benefits discussed:    Anesthesia Type: MAC  Intra-op Monitoring Plan: Standard ASA Monitors  Induction:  IV  Informed Consent: Informed consent signed with the Patient and all parties understand the risks and agree with anesthesia plan.  All questions answered.   ASA Score: 2  Day of Surgery Review of History & Physical: I have interviewed and examined the patient. I have reviewed the patient's H&P dated:     Ready For Surgery From Anesthesia Perspective.     .

## 2023-02-14 NOTE — PROVATION PATIENT INSTRUCTIONS
Discharge Summary/Instructions after an Endoscopic Procedure  Patient Name: Raphael Nelson  Patient MRN: [id number]  Patient YOB: 1950 Tuesday, February 14, 2023 Kurt Davey MD  Dear patient,  As a result of recent federal legislation (The Federal Cures Act), you may   receive lab or pathology results from your procedure in your [facility]   account before your physician is able to contact you. Your physician or   their representative will relay the results to you with their   recommendations at their soonest availability.  Thank you,  RESTRICTIONS:  During your procedure today, you received medications for sedation.  These   medications may affect your judgment, balance and coordination.  Therefore,   for 24 hours, you have the following restrictions:   - DO NOT drive a car, operate machinery, make legal/financial decisions,   sign important papers or drink alcohol.    ACTIVITY:  Today: no heavy lifting, straining or running due to procedural   sedation/anesthesia.  The following day: return to full activity including work.  DIET:  Eat and drink normally unless instructed otherwise.     TREATMENT FOR COMMON SIDE EFFECTS:  - Mild abdominal pain, nausea, belching, bloating or excessive gas:  rest,   eat lightly and use a heating pad.  - Sore Throat: treat with throat lozenges and/or gargle with warm salt   water.  - Because air was used during the procedure, expelling large amounts of air   from your rectum or belching is normal.  - If a bowel prep was taken, you may not have a bowel movement for 1-3 days.    This is normal.  SYMPTOMS TO WATCH FOR AND REPORT TO YOUR PHYSICIAN:  1. Abdominal pain or bloating, other than gas cramps.  2. Chest pain.  3. Back pain.  4. Signs of infection such as: chills or fever occurring within 24 hours   after the procedure.  5. Rectal bleeding, which would show as bright red, maroon, or black stools.   (A tablespoon of blood from the rectum is not serious, especially  if   hemorrhoids are present.)  6. Vomiting.  7. Weakness or dizziness.  GO DIRECTLY TO THE NEAREST EMERGENCY ROOM IF YOU HAVE ANY OF THE FOLLOWING:      Difficulty breathing              Chills and/or fever over 101 F   Persistent vomiting and/or vomiting blood   Severe abdominal pain   Severe chest pain   Black, tarry stools   Bleeding- more than one tablespoon   Any other symptom or condition that you feel may need urgent attention  Your doctor recommends these additional instructions:  If any biopsies were taken, your doctors clinic will contact you in 1 to 2   weeks with any results.  - Patient has a contact number available for emergencies.  The signs and   symptoms of potential delayed complications were discussed with the   patient.  Return to normal activities tomorrow.  Written discharge   instructions were provided to the patient.   - Resume previous diet.   - Continue present medications.   - Await pathology results.   - Repeat colonoscopy in 5 years for surveillance.   - Telephone my office for pathology results in 2 weeks.   - Discharge patient to home (via wheelchair).  For questions, problems or results please call your physician Kurt Davey MD at Work:  [phone]  If you have any questions about the above instructions, call the GI   department at [phone] or call the endoscopy unit at [phone]   from 7am until 3 pm.  [facility] [place], EMERGENCY ROOM PHONE NUMBER:   [phone]  IF A COMPLICATION OR EMERGENCY SITUATION ARISES AND YOU ARE UNABLE TO REACH   YOUR PHYSICIAN - GO DIRECTLY TO THE EMERGENCY ROOM.  I have read or have had read to me these discharge instructions for my   procedure and have received a written copy.  I understand these   instructions and will follow-up with my physician if I have any questions.     __________________________________       _____________________________________  Nurse Signature                                           Patient/Designated   Responsible Party Signature  Kurt Davey MD  2/14/2023 11:35:13 AM  This report has been verified and signed electronically.  Dear patient,  As a result of recent federal legislation (The Federal Cures Act), you may   receive lab or pathology results from your procedure in your [facility]   account before your physician is able to contact you. Your physician or   their representative will relay the results to you with their   recommendations at their soonest availability.  Thank you,  PROVATION

## 2023-02-14 NOTE — ANESTHESIA POSTPROCEDURE EVALUATION
Anesthesia Post Evaluation    Patient: Raphael Nelson JrJorge A    Procedure(s) Performed: Procedure(s) (LRB):  COLONOSCOPY (N/A)    Final Anesthesia Type: MAC      Patient location during evaluation: GI PACU  Patient participation: Yes- Able to Participate  Level of consciousness: awake and alert  Post-procedure vital signs: reviewed and stable  Pain management: adequate  Airway patency: patent  LESTER mitigation strategies: Multimodal analgesia  PONV status at discharge: No PONV  Anesthetic complications: no      Cardiovascular status: blood pressure returned to baseline  Respiratory status: unassisted and spontaneous ventilation  Hydration status: euvolemic  Follow-up not needed.          Vitals Value Taken Time   /63 02/14/23 1150   Temp 36.4 °C (97.6 °F) 02/14/23 1150   Pulse 67 02/14/23 1150   Resp 16 02/14/23 1150   SpO2 96 % 02/14/23 1150         Event Time   Out of Recovery 11:57:37         Pain/Eve Score: Eve Score: 10 (2/14/2023 11:53 AM)

## 2023-02-14 NOTE — H&P
Short Stay Endoscopy History and Physical    PCP - Kurt Davey MD    Procedure - Colonoscopy  ASA - 2  Mallampati - per anesthesia  History of Anesthesia problems - no  Family history Anesthesia problems -  no     HPI:  This is a 72 y.o. male here for evaluation of :   Active Hospital Problems    Diagnosis  POA    Colon cancer screening [Z12.11]  Not Applicable      Resolved Hospital Problems   No resolved problems to display.         Health Maintenance         Date Due Completion Date    Shingles Vaccine (2 of 3) 08/13/2015 6/18/2015    COVID-19 Vaccine (5 - Booster) 01/08/2022 11/13/2021    Colorectal Cancer Screening 03/06/2022 3/6/2012    TETANUS VACCINE 05/13/2025 5/13/2015    Lipid Panel 12/15/2027 12/15/2022            Screening - Yes  History of polyps - No     Diarrhea - no  Anemia - no  Blood in stools - no  Abdominal pain - no  Other - no    ROS:  CONSTITUTIONAL: Denies weight change,  fatigue, fevers, chills, night sweats.  CARDIOVASCULAR: Denies chest pain, shortness of breath, orthopnea and edema.  RESPIRATORY: Denies cough, hemoptysis, dyspnea, and wheezing.  GI: See HPI.    Medical History:   Past Medical History:   Diagnosis Date    Allergy     Anxiety     BPH (benign prostatic hyperplasia)     Cataract     Hyperlipidemia 12/05/2017    The cholesterol has been high and the ASCVD score is also high inducing the desire to treat this with mediciations.  Lab Results Component Value Date  CHOL 212 (H) 12/05/2017  CHOL 184 05/14/2015  Lab Results Component Value Date  HDL 54 12/05/2017  HDL 47 05/14/2015  Lab Results Component Value Date  LDLCALC 143.0 12/05/2017  LDLCALC 118.4 05/14/2015  Lab Results Component Value Date  TRIG 75 12/    Rotator cuff disorder     Sinus tachycardia        Surgical History:   Past Surgical History:   Procedure Laterality Date    ADENOIDECTOMY      APPENDECTOMY      COLONOSCOPY  3/1/2013    was normal.    TONSILLECTOMY         Family History:   Family History   Problem  Relation Age of Onset    Heart disease Mother     COPD Mother     Diabetes Father     Heart disease Father        Social History:   Social History     Tobacco Use    Smoking status: Never    Smokeless tobacco: Never   Substance Use Topics    Alcohol use: Yes     Comment: Socially    Drug use: Never       Allergies:   Review of patient's allergies indicates:   Allergen Reactions    Codeine      Itching    Meperidine Itching       Medications:   No current facility-administered medications on file prior to encounter.     Current Outpatient Medications on File Prior to Encounter   Medication Sig Dispense Refill    arginine HCl, L-arginine, 1,000 mg Tab Take by mouth once daily.      b complex vitamins tablet Take 1 tablet by mouth once daily.      BORON ORAL Take 3 mg by mouth.      cholecalciferol, vitamin D3, 125 mcg (5,000 unit) Tab Take 5,000 Units by mouth once daily.      cyclobenzaprine (FLEXERIL) 10 MG tablet Take 1 tablet (10 mg total) by mouth 3 (three) times daily as needed for Muscle spasms. 30 tablet 11    dutasteride (AVODART) 0.5 mg capsule Take 1 capsule (0.5 mg total) by mouth once daily. 90 capsule 3    fluticasone propionate (FLONASE) 50 mcg/actuation nasal spray 1 spray by Each Nostril route once daily.      guaiFENesin (MUCINEX) 600 mg 12 hr tablet Take 1,200 mg by mouth 2 (two) times daily.      ibuprofen (ADVIL,MOTRIN) 200 MG tablet Take 800 mg by mouth 3 (three) times daily.      krill oil 500 mg Cap Take by mouth once daily.      magnesium 250 mg Tab Take by mouth once.      meloxicam (MOBIC) 15 MG tablet Take 1 tablet (15 mg total) by mouth as needed for Pain. 90 tablet 3    metoprolol succinate (TOPROL-XL) 25 MG 24 hr tablet Take 1 tablet (25 mg total) by mouth once daily. 90 tablet 3    multivitamin capsule Take 1 capsule by mouth once daily.      prasterone, dhea, 50 mg Cap Take 50 mg by mouth.      tamsulosin (FLOMAX) 0.4 mg Cap Take 1 capsule (0.4 mg total) by mouth once daily. 90 capsule  3    pentoxifylline (TRENTAL) 400 mg TbSR       tadalafiL (CIALIS) 5 MG tablet Take 0.5 tablets (2.5 mg total) by mouth daily as needed. 90 tablet 3    [DISCONTINUED] sodium,potassium,mag sulfates (SUPREP BOWEL PREP KIT) 17.5-3.13-1.6 gram SolR Take as instructed on prep sheet 354 mL 0       Physical Exam:  Vital Signs:   Vitals:    02/14/23 1024   BP: (!) 151/67   Pulse: 68   Resp: 18   Temp: 98.2 °F (36.8 °C)     General Appearance: Well appearing in no acute distress  ENT: OP clear  Chest: CTA B  CV: RRR, no m/r/g  Abd: s/nt/nd/nabs  Ext: no edema    Labs:Reviewed    IMP:  Active Hospital Problems    Diagnosis  POA    Colon cancer screening [Z12.11]  Not Applicable      Resolved Hospital Problems   No resolved problems to display.         Plan:   I have explained the risks and benefits of colonoscopy to the patient including but not limited to bleeding, perforation, infection, and death. The patient wishes to proceed.

## 2023-02-15 VITALS
TEMPERATURE: 98 F | BODY MASS INDEX: 30.1 KG/M2 | HEIGHT: 71 IN | SYSTOLIC BLOOD PRESSURE: 128 MMHG | RESPIRATION RATE: 16 BRPM | DIASTOLIC BLOOD PRESSURE: 63 MMHG | WEIGHT: 215 LBS | HEART RATE: 67 BPM | OXYGEN SATURATION: 96 %

## 2023-02-17 LAB
FINAL PATHOLOGIC DIAGNOSIS: NORMAL
Lab: NORMAL

## 2023-03-16 ENCOUNTER — TELEPHONE (OUTPATIENT)
Dept: FAMILY MEDICINE | Facility: CLINIC | Age: 73
End: 2023-03-16
Payer: MEDICARE

## 2023-03-16 DIAGNOSIS — M62.830 SPASM OF LUMBAR PARASPINOUS MUSCLE: Chronic | ICD-10-CM

## 2023-03-16 RX ORDER — MELOXICAM 15 MG/1
15 TABLET ORAL DAILY PRN
Qty: 90 TABLET | Refills: 3 | Status: SHIPPED | OUTPATIENT
Start: 2023-03-16 | End: 2024-03-27 | Stop reason: SDUPTHER

## 2023-03-16 NOTE — TELEPHONE ENCOUNTER
----- Message from Sharda Luna sent at 3/16/2023  9:48 AM CDT -----  Contact: pt  Pt is calling in regard to his med,     Type:  RX Refill Request    Who Called:  pt  Refill or New Rx: refill  RX Name and Strength: meloxicam (MOBIC) 15 MG tablet  How is the patient currently taking it? (ex. 1XDay):   Is this a 30 day or 90 day RX:   Preferred Pharmacy with phone number:[phone]  Local or Mail Order: local  Ordering Provider: delores  Would the patient rather a call back or a response via [facility]?   Best Call Back Number:  Additional Information:  pt states that [facility] doesn't want to fill this med, b/c no directions on it.  Pt is asking to resend to the pharm below:       [id number] - [place] - [address] AT [address] [facility] [address]  [address]  [place] [address]  Phone: [phone] Fax: [phone]

## 2023-03-16 NOTE — TELEPHONE ENCOUNTER
----- Message from Mario Ag sent at 3/16/2023  9:58 AM CDT -----  Contact: Beti Beebe is calling in regards to the direction for meloxicam (MOBIC) 15 MG tablet. Call her back at [phone]      thanks  cf

## 2023-03-20 NOTE — PROGRESS NOTES
Dear Kurt Davey MD,    I recently cared for Raphael Nelson Jr. and performed an endoscopy.  Tissue was sent for pathology evaluation and I will have a letter written to ask the patient to repeat the colonoscopy in 5 years.  The pathology showed that there was adenomatous tissue present.  Thank you for allowing me to participate in the care of your patient.  Please call me for any questions that you might have.      Dr. Kurt Davey  [phone] cell  [phone] office      NURSING STAFF:Please  inform the patient that I reviewed the recent pathology obtained at the time of colonoscopy.    The results showed that there was adenomatous tissue present which is benign and based on that, I recommend that the patient have a repeat colonoscopy performed in 5 years.     If the patient has MyChart, this message has been sent to them.  Confirm that they read the note.  If not, copy the information and print a letter to send to the patient at this time.  Confirm that a notation to the PCP was done.      Dear Raphael Nelosn JrJorge A,    This is to inform you that I have reviewed your recent colonoscopy pathology.  The results showed that you had adenomatous tissue present which is benign and based on that, I recommend that you have a repeat colonoscopy performed in 5 years.      Dr. Kurt Davey  [phone]   Patient

## 2023-03-24 ENCOUNTER — TELEPHONE (OUTPATIENT)
Dept: ADMINISTRATIVE | Facility: CLINIC | Age: 73
End: 2023-03-24
Payer: MEDICARE

## 2023-03-24 NOTE — TELEPHONE ENCOUNTER
Called pt, no answer; left message informing pt I was calling to remind pt of his in office EAWV on 3/27/23 and to return my call if he had any questions; left my name and number

## 2023-03-27 ENCOUNTER — TELEPHONE (OUTPATIENT)
Dept: FAMILY MEDICINE | Facility: CLINIC | Age: 73
End: 2023-03-27
Payer: MEDICARE

## 2023-03-27 ENCOUNTER — TELEPHONE (OUTPATIENT)
Dept: ADMINISTRATIVE | Facility: HOSPITAL | Age: 73
End: 2023-03-27
Payer: MEDICARE

## 2023-03-27 NOTE — TELEPHONE ENCOUNTER
----- Message from Mario Ag sent at 3/27/2023 11:06 AM CDT -----  Raphael is calling in regards to his appointment on 3/27 . Patient states he thought it was a virtual appointment . Raphael states is there anyway he get is a virtual appointment for Thursday. Please call him back at [phone]    Thanks  CF

## 2023-05-01 ENCOUNTER — TELEPHONE (OUTPATIENT)
Dept: ADMINISTRATIVE | Facility: CLINIC | Age: 73
End: 2023-05-01
Payer: MEDICARE

## 2023-05-01 NOTE — TELEPHONE ENCOUNTER
Called pt, no answer; left message informing pt I was calling to remind pt of his in office EAWV on 5/3/23 and to return my call if he had any questions; left my name and number

## 2023-05-03 ENCOUNTER — OFFICE VISIT (OUTPATIENT)
Dept: FAMILY MEDICINE | Facility: CLINIC | Age: 73
End: 2023-05-03
Payer: MEDICARE

## 2023-05-03 VITALS
SYSTOLIC BLOOD PRESSURE: 127 MMHG | WEIGHT: 214.19 LBS | HEART RATE: 72 BPM | HEIGHT: 71 IN | TEMPERATURE: 98 F | BODY MASS INDEX: 29.98 KG/M2 | DIASTOLIC BLOOD PRESSURE: 66 MMHG

## 2023-05-03 DIAGNOSIS — M47.9 SPONDYLOSIS: ICD-10-CM

## 2023-05-03 DIAGNOSIS — Z87.898 HISTORY OF ELEVATED PSA: ICD-10-CM

## 2023-05-03 DIAGNOSIS — E78.5 HYPERLIPIDEMIA, UNSPECIFIED HYPERLIPIDEMIA TYPE: ICD-10-CM

## 2023-05-03 DIAGNOSIS — H40.059 OCULAR HYPERTENSION, UNSPECIFIED LATERALITY: ICD-10-CM

## 2023-05-03 DIAGNOSIS — Z91.89 RISK FOR CORONARY ARTERY DISEASE BETWEEN 10% AND 20% IN NEXT 10 YEARS PER FRAMINGHAM SCORE: ICD-10-CM

## 2023-05-03 DIAGNOSIS — Z00.00 ENCOUNTER FOR PREVENTIVE CARE: Primary | ICD-10-CM

## 2023-05-03 DIAGNOSIS — F32.A DEPRESSION, UNSPECIFIED DEPRESSION TYPE: ICD-10-CM

## 2023-05-03 DIAGNOSIS — N40.0 BENIGN PROSTATIC HYPERPLASIA, UNSPECIFIED WHETHER LOWER URINARY TRACT SYMPTOMS PRESENT: ICD-10-CM

## 2023-05-03 DIAGNOSIS — F43.10 PTSD (POST-TRAUMATIC STRESS DISORDER): ICD-10-CM

## 2023-05-03 DIAGNOSIS — Z79.899 ENCOUNTER FOR LONG-TERM (CURRENT) USE OF MEDICATIONS: Chronic | ICD-10-CM

## 2023-05-03 DIAGNOSIS — Z86.010 HISTORY OF COLON POLYPS: ICD-10-CM

## 2023-05-03 PROCEDURE — 1170F PR FUNCTIONAL STATUS ASSESSED: ICD-10-PCS | Mod: CPTII,S$GLB,, | Performed by: NURSE PRACTITIONER

## 2023-05-03 PROCEDURE — G0439 PPPS, SUBSEQ VISIT: HCPCS | Mod: S$GLB,,, | Performed by: NURSE PRACTITIONER

## 2023-05-03 PROCEDURE — 1101F PR PT FALLS ASSESS DOC 0-1 FALLS W/OUT INJ PAST YR: ICD-10-PCS | Mod: CPTII,S$GLB,, | Performed by: NURSE PRACTITIONER

## 2023-05-03 PROCEDURE — 3008F BODY MASS INDEX DOCD: CPT | Mod: CPTII,S$GLB,, | Performed by: NURSE PRACTITIONER

## 2023-05-03 PROCEDURE — 1170F FXNL STATUS ASSESSED: CPT | Mod: CPTII,S$GLB,, | Performed by: NURSE PRACTITIONER

## 2023-05-03 PROCEDURE — G0439 PR MEDICARE ANNUAL WELLNESS SUBSEQUENT VISIT: ICD-10-PCS | Mod: S$GLB,,, | Performed by: NURSE PRACTITIONER

## 2023-05-03 PROCEDURE — 1160F RVW MEDS BY RX/DR IN RCRD: CPT | Mod: CPTII,S$GLB,, | Performed by: NURSE PRACTITIONER

## 2023-05-03 PROCEDURE — 3074F SYST BP LT 130 MM HG: CPT | Mod: CPTII,S$GLB,, | Performed by: NURSE PRACTITIONER

## 2023-05-03 PROCEDURE — 1160F PR REVIEW ALL MEDS BY PRESCRIBER/CLIN PHARMACIST DOCUMENTED: ICD-10-PCS | Mod: CPTII,S$GLB,, | Performed by: NURSE PRACTITIONER

## 2023-05-03 PROCEDURE — 1101F PT FALLS ASSESS-DOCD LE1/YR: CPT | Mod: CPTII,S$GLB,, | Performed by: NURSE PRACTITIONER

## 2023-05-03 PROCEDURE — 1159F PR MEDICATION LIST DOCUMENTED IN MEDICAL RECORD: ICD-10-PCS | Mod: CPTII,S$GLB,, | Performed by: NURSE PRACTITIONER

## 2023-05-03 PROCEDURE — 3288F PR FALLS RISK ASSESSMENT DOCUMENTED: ICD-10-PCS | Mod: CPTII,S$GLB,, | Performed by: NURSE PRACTITIONER

## 2023-05-03 PROCEDURE — 3074F PR MOST RECENT SYSTOLIC BLOOD PRESSURE < 130 MM HG: ICD-10-PCS | Mod: CPTII,S$GLB,, | Performed by: NURSE PRACTITIONER

## 2023-05-03 PROCEDURE — 1159F MED LIST DOCD IN RCRD: CPT | Mod: CPTII,S$GLB,, | Performed by: NURSE PRACTITIONER

## 2023-05-03 PROCEDURE — 3078F PR MOST RECENT DIASTOLIC BLOOD PRESSURE < 80 MM HG: ICD-10-PCS | Mod: CPTII,S$GLB,, | Performed by: NURSE PRACTITIONER

## 2023-05-03 PROCEDURE — 3078F DIAST BP <80 MM HG: CPT | Mod: CPTII,S$GLB,, | Performed by: NURSE PRACTITIONER

## 2023-05-03 PROCEDURE — 99999 PR PBB SHADOW E&M-EST. PATIENT-LVL V: ICD-10-PCS | Mod: PBBFAC,,, | Performed by: NURSE PRACTITIONER

## 2023-05-03 PROCEDURE — 99999 PR PBB SHADOW E&M-EST. PATIENT-LVL V: CPT | Mod: PBBFAC,,, | Performed by: NURSE PRACTITIONER

## 2023-05-03 PROCEDURE — 3008F PR BODY MASS INDEX (BMI) DOCUMENTED: ICD-10-PCS | Mod: CPTII,S$GLB,, | Performed by: NURSE PRACTITIONER

## 2023-05-03 PROCEDURE — 3288F FALL RISK ASSESSMENT DOCD: CPT | Mod: CPTII,S$GLB,, | Performed by: NURSE PRACTITIONER

## 2023-05-03 RX ORDER — AZITHROMYCIN 250 MG/1
TABLET, FILM COATED ORAL
COMMUNITY
Start: 2023-03-29 | End: 2023-06-07

## 2023-05-03 RX ORDER — METHYLPREDNISOLONE 4 MG/1
TABLET ORAL
COMMUNITY
Start: 2023-03-22 | End: 2023-06-22 | Stop reason: ALTCHOICE

## 2023-05-03 RX ORDER — ALBUTEROL SULFATE 90 UG/1
2 AEROSOL, METERED RESPIRATORY (INHALATION)
COMMUNITY
Start: 2023-03-29

## 2023-05-03 RX ORDER — SILDENAFIL 100 MG/1
100 TABLET, FILM COATED ORAL
COMMUNITY
Start: 2022-11-28

## 2023-05-03 RX ORDER — LATANOPROST 50 UG/ML
1 SOLUTION/ DROPS OPHTHALMIC NIGHTLY
COMMUNITY
Start: 2023-02-20

## 2023-05-03 RX ORDER — BENZONATATE 200 MG/1
CAPSULE ORAL
COMMUNITY
Start: 2023-02-20

## 2023-05-03 NOTE — PROGRESS NOTES
"  Raphael Nelson presented for a follow-up Medicare AWV today. The following components were reviewed and updated:    Medical history  Family History  Social history  Allergies and Current Medications  Health Risk Assessment  Health Maintenance  Care Team    **See Completed Assessments for Annual Wellness visit with in the encounter summary    The following assessments were completed:  Depression Screening  Cognitive function Screening  Timed Get Up Test  Whisper Test  PHQ-2  Nutrition screen  ADL  PAQ  Osteoporosis risk  CAGE  Living situation  Vitals:    05/03/23 1035   BP: 127/66   Pulse: 72   Temp: 97.7 °F (36.5 °C)   Weight: 97.2 kg (214 lb 3.2 oz)   Height: 5' 10.5" (1.791 m)     Body mass index is 30.3 kg/m².   ]        Diagnoses and health risks identified today and associated recommendations/orders:  1. Encounter for preventive care  Stable  Up to date    2. Depression, unspecified depression type  Stable  Managed by psychiatry; [facility] [place] [facility]  Continue current medications, plan of care  F/U with specialist as advised    3. PTSD (post-traumatic stress disorder)  Stable  Managed by psychiatry; [facility] [place] [facility]  Continue current medications, plan of care  F/U with specialist as advised    4. Hyperlipidemia, unspecified hyperlipidemia type  Stable   Managed by PCP   Low cholesterol diet recommended  Continue current medications, plan of care  F/U with PCP as advised    5. Spondylosis  Stable   Managed by orthopedic surgery  Continue current medications, plan of care  F/U with specialist as advised    6. Ocular hypertension, unspecified laterality  Stable   Managed by ophthalmology  Continue current medications, plan of care  F/U with specialist as advised    7. Benign prostatic hyperplasia, unspecified whether lower urinary tract symptoms present  Stable   Managed by urology  Continue current medications, plan of care  F/U with specialist as advised    8. Risk for coronary artery " disease between 10% and 20% in next 10 years per Hopkins score  Stable  Continue current plan    9. History of elevated PSA  Stable   Managed by urology  Continue current medications, plan of care  F/U with specialist as advised    10. History of colon polyps  Stable  Colonoscopy UTD  Colonoscopy per colon cancer screening guidelines    11. Encounter for long-term (current) use of medications  Stable  Continue current plan of care    No current opioid use  I offered to discuss end of life issues, including information on how to make advance directives that the patient could use to name someone who would make medical decisions on their behalf if they became too ill to make themselves.    ___Patient declined - already done.    _x__Patient is interested, I provided paperwork and offered to discuss  Provided Raphael with a 5-10 year written screening schedule and personal prevention plan. Recommendations were developed using the USPSTF age appropriate recommendations. Education, counseling, and referrals were provided as needed.  After Visit Summary printed and given to patient which includes a list of additional screenings\tests needed.    No follow-ups on file.      Sheila Chavez NP

## 2023-05-26 DIAGNOSIS — Z76.0 MEDICATION REFILL: ICD-10-CM

## 2023-05-26 RX ORDER — METOPROLOL SUCCINATE 25 MG/1
25 TABLET, EXTENDED RELEASE ORAL DAILY
Qty: 90 TABLET | Refills: 3 | Status: SHIPPED | OUTPATIENT
Start: 2023-05-26 | End: 2024-03-27 | Stop reason: SDUPTHER

## 2023-06-07 ENCOUNTER — OFFICE VISIT (OUTPATIENT)
Dept: FAMILY MEDICINE | Facility: CLINIC | Age: 73
End: 2023-06-07
Payer: MEDICARE

## 2023-06-07 VITALS
HEART RATE: 87 BPM | BODY MASS INDEX: 30.76 KG/M2 | DIASTOLIC BLOOD PRESSURE: 64 MMHG | WEIGHT: 219.69 LBS | SYSTOLIC BLOOD PRESSURE: 134 MMHG | HEIGHT: 71 IN | TEMPERATURE: 98 F

## 2023-06-07 DIAGNOSIS — F41.9 ANXIETY: ICD-10-CM

## 2023-06-07 DIAGNOSIS — F33.2 SEVERE EPISODE OF RECURRENT MAJOR DEPRESSIVE DISORDER, WITHOUT PSYCHOTIC FEATURES: Primary | ICD-10-CM

## 2023-06-07 PROBLEM — Z12.11 COLON CANCER SCREENING: Status: RESOLVED | Noted: 2023-02-14 | Resolved: 2023-06-07

## 2023-06-07 PROBLEM — Z23 FLU VACCINE NEED: Status: RESOLVED | Noted: 2019-12-06 | Resolved: 2023-06-07

## 2023-06-07 PROCEDURE — 3078F PR MOST RECENT DIASTOLIC BLOOD PRESSURE < 80 MM HG: ICD-10-PCS | Mod: CPTII,S$GLB,, | Performed by: FAMILY MEDICINE

## 2023-06-07 PROCEDURE — 3288F PR FALLS RISK ASSESSMENT DOCUMENTED: ICD-10-PCS | Mod: CPTII,S$GLB,, | Performed by: FAMILY MEDICINE

## 2023-06-07 PROCEDURE — 3075F SYST BP GE 130 - 139MM HG: CPT | Mod: CPTII,S$GLB,, | Performed by: FAMILY MEDICINE

## 2023-06-07 PROCEDURE — 99214 OFFICE O/P EST MOD 30 MIN: CPT | Mod: S$GLB,,, | Performed by: FAMILY MEDICINE

## 2023-06-07 PROCEDURE — 1159F PR MEDICATION LIST DOCUMENTED IN MEDICAL RECORD: ICD-10-PCS | Mod: CPTII,S$GLB,, | Performed by: FAMILY MEDICINE

## 2023-06-07 PROCEDURE — 1101F PR PT FALLS ASSESS DOC 0-1 FALLS W/OUT INJ PAST YR: ICD-10-PCS | Mod: CPTII,S$GLB,, | Performed by: FAMILY MEDICINE

## 2023-06-07 PROCEDURE — 3008F PR BODY MASS INDEX (BMI) DOCUMENTED: ICD-10-PCS | Mod: CPTII,S$GLB,, | Performed by: FAMILY MEDICINE

## 2023-06-07 PROCEDURE — 99999 PR PBB SHADOW E&M-EST. PATIENT-LVL V: ICD-10-PCS | Mod: PBBFAC,,, | Performed by: FAMILY MEDICINE

## 2023-06-07 PROCEDURE — 1101F PT FALLS ASSESS-DOCD LE1/YR: CPT | Mod: CPTII,S$GLB,, | Performed by: FAMILY MEDICINE

## 2023-06-07 PROCEDURE — 3008F BODY MASS INDEX DOCD: CPT | Mod: CPTII,S$GLB,, | Performed by: FAMILY MEDICINE

## 2023-06-07 PROCEDURE — 99214 PR OFFICE/OUTPT VISIT, EST, LEVL IV, 30-39 MIN: ICD-10-PCS | Mod: S$GLB,,, | Performed by: FAMILY MEDICINE

## 2023-06-07 PROCEDURE — 3288F FALL RISK ASSESSMENT DOCD: CPT | Mod: CPTII,S$GLB,, | Performed by: FAMILY MEDICINE

## 2023-06-07 PROCEDURE — 3075F PR MOST RECENT SYSTOLIC BLOOD PRESS GE 130-139MM HG: ICD-10-PCS | Mod: CPTII,S$GLB,, | Performed by: FAMILY MEDICINE

## 2023-06-07 PROCEDURE — 1159F MED LIST DOCD IN RCRD: CPT | Mod: CPTII,S$GLB,, | Performed by: FAMILY MEDICINE

## 2023-06-07 PROCEDURE — 99999 PR PBB SHADOW E&M-EST. PATIENT-LVL V: CPT | Mod: PBBFAC,,, | Performed by: FAMILY MEDICINE

## 2023-06-07 PROCEDURE — 3078F DIAST BP <80 MM HG: CPT | Mod: CPTII,S$GLB,, | Performed by: FAMILY MEDICINE

## 2023-06-07 RX ORDER — SERTRALINE HYDROCHLORIDE 50 MG/1
TABLET, FILM COATED ORAL
Qty: 30 TABLET | Refills: 0 | Status: SHIPPED | OUTPATIENT
Start: 2023-06-07 | End: 2023-06-23

## 2023-06-07 RX ORDER — ALPRAZOLAM 0.25 MG/1
0.25 TABLET ORAL 3 TIMES DAILY PRN
Qty: 20 TABLET | Refills: 0 | Status: SHIPPED | OUTPATIENT
Start: 2023-06-07 | End: 2023-06-23 | Stop reason: SDUPTHER

## 2023-06-07 NOTE — PROGRESS NOTES
Presents due to chronic depression anxiety.   has had this since 1968 associated with [place].   previously has taken several different medications through an outside practitioner but never took anything from more than a month.  [facility], but appointment not till August.  He gets irritability and feels overwhelmed.  He did have some suicidal ideation in the past but denies at present.  No hallucinations.  Does have support home with spouse.    Raphael was seen today for depression.    Diagnoses and all orders for this visit:    Severe episode of recurrent major depressive disorder, without psychotic features    Anxiety    Other orders  -     sertraline (ZOLOFT) 50 MG tablet; Take 1/2 po daily for 1 week, then 1 po daily  -     ALPRAZolam (XANAX) 0.25 MG tablet; Take 1 tablet (0.25 mg total) by mouth 3 (three) times daily as needed for Anxiety.      [facility]  Will have him follow-up PCP 2 weeks and follow-up as needed prior are the here or ER if symptoms worsen            Past Medical History:  Past Medical History:   Diagnosis Date    Allergy     Anxiety     BPH (benign prostatic hyperplasia)     Cataract     Hyperlipidemia 12/05/2017    The cholesterol has been high and the ASCVD score is also high inducing the desire to treat this with mediciations.  Lab Results Component Value Date  CHOL 212 (H) 12/05/2017  CHOL 184 05/14/2015  Lab Results Component Value Date  HDL 54 12/05/2017  HDL 47 05/14/2015  Lab Results Component Value Date  LDLCALC 143.0 12/05/2017  LDLCALC 118.4 05/14/2015  Lab Results Component Value Date  TRIG 75 12/    Rotator cuff disorder     Sinus tachycardia      Past Surgical History:   Procedure Laterality Date    ADENOIDECTOMY      APPENDECTOMY      COLONOSCOPY  3/1/2013    was normal.    COLONOSCOPY N/A 2/14/2023    Procedure: COLONOSCOPY;  Surgeon: Kurt Davey MD;  [facility]  ENDO;  Service: Endoscopy;  Laterality: N/A;    TONSILLECTOMY       Review of patient's allergies indicates:   Allergen Reactions    Codeine      Itching    Meperidine Itching     Current Outpatient Medications on File Prior to Visit   Medication Sig Dispense Refill    albuterol (PROVENTIL/VENTOLIN HFA) 90 mcg/actuation inhaler Inhale 2 puffs into the lungs.      arginine HCl, L-arginine, 1,000 mg Tab Take by mouth once daily.      atorvastatin (LIPITOR) 20 MG tablet TAKE 1 TABLET BY MOUTH ONCE DAILY 90 tablet 0    b complex vitamins tablet Take 1 tablet by mouth once daily.      cholecalciferol, vitamin D3, 125 mcg (5,000 unit) Tab Take 5,000 Units by mouth once daily.      cyclobenzaprine (FLEXERIL) 10 MG tablet Take 1 tablet (10 mg total) by mouth 3 (three) times daily as needed for Muscle spasms. 30 tablet 11    dutasteride (AVODART) 0.5 mg capsule Take 1 capsule (0.5 mg total) by mouth once daily. 90 capsule 3    fluticasone propionate (FLONASE) 50 mcg/actuation nasal spray 1 spray by Each Nostril route once daily.      guaiFENesin (MUCINEX) 600 mg 12 hr tablet Take 1,200 mg by mouth 2 (two) times daily.      ibuprofen (ADVIL,MOTRIN) 200 MG tablet Take 800 mg by mouth 3 (three) times daily.      krill oil 500 mg Cap Take by mouth once daily.      latanoprost 0.005 % ophthalmic solution Place 1 drop into both eyes every evening.      magnesium 250 mg Tab Take by mouth once.      meloxicam (MOBIC) 15 MG tablet Take 1 tablet (15 mg total) by mouth daily as needed for Pain. 90 tablet 3    methylPREDNISolone (MEDROL DOSEPACK) 4 mg tablet FOLLOW PACKAGE DIRECTIONS      metoprolol succinate (TOPROL-XL) 25 MG 24 hr tablet Take 1 tablet (25 mg total) by mouth once daily. 90 tablet 3    multivitamin capsule Take 1 capsule by mouth once daily.      pentoxifylline (TRENTAL) 400 mg TbSR       prasterone, dhea, 50 mg Cap Take 50 mg by mouth.      sildenafiL (VIAGRA) 100 MG tablet Take 100 mg by mouth.      tadalafiL (CIALIS)  "5 MG tablet Take 0.5 tablets (2.5 mg total) by mouth daily as needed. 90 tablet 3    tamsulosin (FLOMAX) 0.4 mg Cap Take 1 capsule (0.4 mg total) by mouth once daily. 90 capsule 3    [DISCONTINUED] azithromycin (Z-MARILIA) 250 MG tablet Take by mouth.      benzonatate (TESSALON) 200 MG capsule        No current facility-administered medications on file prior to visit.     Social History     Socioeconomic History    Marital status:      Spouse name: Cynthia    Number of children: 5   Tobacco Use    Smoking status: Never    Smokeless tobacco: Never   Substance and Sexual Activity    Alcohol use: Yes     Comment: Socially    Drug use: Never    Sexual activity: Yes     Partners: Female     Family History   Problem Relation Age of Onset    Heart disease Mother     COPD Mother     Diabetes Father     Heart disease Father            ROS:  GENERAL: No fever, chills,  or significant weight changes.   CARDIOVASCULAR: Denies chest pain, PND, orthopnea or reduced exercise tolerance.  ABDOMEN: Appetite fine. Denies diarrhea, abdominal pain, hematemesis or blood in stool.  URINARY: No flank pain, dysuria or hematuria.    Vitals:    06/07/23 1453   BP: 134/64   Pulse: 87   Temp: 97.7 °F (36.5 °C)   TempSrc: Temporal   Weight: 99.7 kg (219 lb 11.2 oz)   Height: 5' 10.5" (1.791 m)       Wt Readings from Last 3 Encounters:   06/07/23 99.7 kg (219 lb 11.2 oz)   05/03/23 97.2 kg (214 lb 3.2 oz)   02/14/23 97.5 kg (215 lb)       APPEARANCE: Well nourished, well developed, in no acute distress.  Anxious  HEAD: Normocephalic.  Atraumatic.  EYES:   Right eye: Pupil reactive.  Conjunctiva clear.    Left eye: Pupil reactive.  Conjunctiva clear.    NECK: Supple. No bruits.  No JVD.  No cervical lymphadenopathy.  No thyromegaly.    CHEST: Breath sounds clear bilaterally.  Normal respiratory effort  CARDIOVASCULAR: Normal rate.  Regular rhythm.  No murmurs.  No rub.  No gallops.   No edema.  MENTAL STATUS: Alert.  Oriented x 3.  Anxious  "

## 2023-06-22 ENCOUNTER — TELEPHONE (OUTPATIENT)
Dept: FAMILY MEDICINE | Facility: CLINIC | Age: 73
End: 2023-06-22
Payer: MEDICARE

## 2023-06-23 ENCOUNTER — OFFICE VISIT (OUTPATIENT)
Dept: FAMILY MEDICINE | Facility: CLINIC | Age: 73
End: 2023-06-23
Payer: MEDICARE

## 2023-06-23 DIAGNOSIS — F32.A DEPRESSION, UNSPECIFIED DEPRESSION TYPE: Primary | ICD-10-CM

## 2023-06-23 DIAGNOSIS — F41.9 ANXIETY: ICD-10-CM

## 2023-06-23 PROCEDURE — 99214 PR OFFICE/OUTPT VISIT, EST, LEVL IV, 30-39 MIN: ICD-10-PCS | Mod: 95,,, | Performed by: FAMILY MEDICINE

## 2023-06-23 PROCEDURE — 99214 OFFICE O/P EST MOD 30 MIN: CPT | Mod: 95,,, | Performed by: FAMILY MEDICINE

## 2023-06-23 RX ORDER — SERTRALINE HYDROCHLORIDE 100 MG/1
100 TABLET, FILM COATED ORAL DAILY
Qty: 30 TABLET | Refills: 11 | Status: SHIPPED | OUTPATIENT
Start: 2023-06-23 | End: 2023-12-27

## 2023-06-23 RX ORDER — ALPRAZOLAM 0.25 MG/1
0.25 TABLET ORAL 3 TIMES DAILY PRN
Qty: 20 TABLET | Refills: 0 | Status: SHIPPED | OUTPATIENT
Start: 2023-06-23 | End: 2023-12-27

## 2023-06-23 NOTE — PROGRESS NOTES
Primary Care Telemedicine Note    The patient location is:  Patient Home   The chief complaint leading to consultation is: depression and anxiety  Total time spent with patient: 20 min    Visit type: Virtual visit with synchronous audio only and video  Each patient to whom he or she provides medical services by telemedicine is:  (1) informed of the relationship between the physician and patient and the respective role of any other health care provider with respect to management of the patient; and (2) notified that he or she may decline to receive medical services by telemedicine and may withdraw from such care at any time.    Subjective:      Patient ID: Raphael Nelson Jr. is a 72 y.o. male.    Chief Complaint: as above    Raphael is following up on treatment for depression and anxiety. He complains of the following symptoms:anhedonia, depressed mood, difficulty concentrating, and feelings of worthlessness/guilt and denies suicidal thoughts with specific plan.  Situations that have made the symptoms worse include being around vets and things that makes it better includes his medicine which has helped just a little.  Onset was approximately several months ago and treatment with zoloft was started 2 weeks ago.  The patient is currently gradually improving since that time.  He denies current suicidal and homicidal plan or intent.  He complains of the following side effects from the treatment: none.he is staying in [place] and he is around a lot of veterans. [facility]  He is trying to get through til then.  The medicine has helped a little but he still feels depressed.  He has had panic at times.  He has xanax and uses that a little.  He is on 50 mg of zoloft.  He has not had thoughts of harming himself.  He does feel a low self worth.        Health Maintenance Due   Topic Date Due    Shingles Vaccine (2 of 3) 08/13/2015    COVID-19 Vaccine (5 - Mixed Product series)  01/08/2022       Past Medical History:  Past Medical History:   Diagnosis Date    Allergy     Anxiety     BPH (benign prostatic hyperplasia)     Cataract     Hyperlipidemia 12/05/2017    The cholesterol has been high and the ASCVD score is also high inducing the desire to treat this with mediciations.  Lab Results Component Value Date  CHOL 212 (H) 12/05/2017  CHOL 184 05/14/2015  Lab Results Component Value Date  HDL 54 12/05/2017  HDL 47 05/14/2015  Lab Results Component Value Date  LDLCALC 143.0 12/05/2017  LDLCALC 118.4 05/14/2015  Lab Results Component Value Date  TRIG 75 12/    Rotator cuff disorder     Sinus tachycardia      Past Surgical History:   Procedure Laterality Date    ADENOIDECTOMY      APPENDECTOMY      COLONOSCOPY  3/1/2013    was normal.    COLONOSCOPY N/A 2/14/2023    Procedure: COLONOSCOPY;  Surgeon: Kurt Davey MD;  Location: [facility];  Service: Endoscopy;  Laterality: N/A;    TONSILLECTOMY       Review of patient's allergies indicates:   Allergen Reactions    Codeine      Itching    Meperidine Itching     Current Outpatient Medications on File Prior to Visit   Medication Sig Dispense Refill    albuterol (PROVENTIL/VENTOLIN HFA) 90 mcg/actuation inhaler Inhale 2 puffs into the lungs.      arginine HCl, L-arginine, 1,000 mg Tab Take by mouth once daily.      atorvastatin (LIPITOR) 20 MG tablet TAKE 1 TABLET BY MOUTH ONCE DAILY 90 tablet 0    b complex vitamins tablet Take 1 tablet by mouth once daily.      benzonatate (TESSALON) 200 MG capsule       cholecalciferol, vitamin D3, 125 mcg (5,000 unit) Tab Take 5,000 Units by mouth once daily.      cyclobenzaprine (FLEXERIL) 10 MG tablet Take 1 tablet (10 mg total) by mouth 3 (three) times daily as needed for Muscle spasms. 30 tablet 11    dutasteride (AVODART) 0.5 mg capsule Take 1 capsule (0.5 mg total) by mouth once daily. 90 capsule 3    fluticasone propionate (FLONASE) 50 mcg/actuation nasal spray 1 spray by Each  Nostril route once daily.      guaiFENesin (MUCINEX) 600 mg 12 hr tablet Take 1,200 mg by mouth 2 (two) times daily.      ibuprofen (ADVIL,MOTRIN) 200 MG tablet Take 800 mg by mouth 3 (three) times daily.      krill oil 500 mg Cap Take by mouth once daily.      latanoprost 0.005 % ophthalmic solution Place 1 drop into both eyes every evening.      magnesium 250 mg Tab Take by mouth once.      meloxicam (MOBIC) 15 MG tablet Take 1 tablet (15 mg total) by mouth daily as needed for Pain. 90 tablet 3    metoprolol succinate (TOPROL-XL) 25 MG 24 hr tablet Take 1 tablet (25 mg total) by mouth once daily. 90 tablet 3    multivitamin capsule Take 1 capsule by mouth once daily.      pentoxifylline (TRENTAL) 400 mg TbSR       prasterone, dhea, 50 mg Cap Take 50 mg by mouth.      sildenafiL (VIAGRA) 100 MG tablet Take 100 mg by mouth.      tadalafiL (CIALIS) 5 MG tablet Take 0.5 tablets (2.5 mg total) by mouth daily as needed. 90 tablet 3    tamsulosin (FLOMAX) 0.4 mg Cap Take 1 capsule (0.4 mg total) by mouth once daily. 90 capsule 3    [DISCONTINUED] ALPRAZolam (XANAX) 0.25 MG tablet Take 1 tablet (0.25 mg total) by mouth 3 (three) times daily as needed for Anxiety. 20 tablet 0    [DISCONTINUED] sertraline (ZOLOFT) 50 MG tablet Take 1/2 po daily for 1 week, then 1 po daily 30 tablet 0     No current facility-administered medications on file prior to visit.     Social History     Socioeconomic History    Marital status:      Spouse name: Cynthia    Number of children: 5   Tobacco Use    Smoking status: Never    Smokeless tobacco: Never   Substance and Sexual Activity    Alcohol use: Yes     Comment: Socially    Drug use: Never    Sexual activity: Yes     Partners: Female     Family History   Problem Relation Age of Onset    Heart disease Mother     COPD Mother     Diabetes Father     Heart disease Father        Review of Systems   Constitutional:  Positive for activity change. Negative for  unexpected weight change.   HENT:  Positive for hearing loss and rhinorrhea. Negative for trouble swallowing.    Eyes:  Positive for discharge. Negative for visual disturbance.   Respiratory:  Positive for chest tightness. Negative for wheezing.    Cardiovascular:  Positive for chest pain and palpitations.   Gastrointestinal:  Negative for blood in stool, constipation, diarrhea and vomiting.   Endocrine: Negative for polydipsia and polyuria.   Genitourinary:  Negative for difficulty urinating, hematuria and urgency.   Musculoskeletal:  Positive for arthralgias and neck pain. Negative for joint swelling.   Neurological:  Negative for weakness and headaches.   Psychiatric/Behavioral:  Positive for dysphoric mood. Negative for confusion.    Positive for anxiety and depression  Objective:      Physical Exam      Normal., Alert and oriented, appropriate affect., Behavior: normal, Speech: appropriate quality, quantity and organization of sentences, Thought content: normal, and Affect: depressed  Assessment:       1. Depression, unspecified depression type    2. Anxiety        Plan:   I have discontinued Raphael JEAN-BAPTISTE Leslie Silver's sertraline. I am also having him start on sertraline. Additionally, I am having him maintain his krill oil, arginine HCl (L-arginine), multivitamin, cholecalciferol (vitamin D3), b complex vitamins, ibuprofen, guaiFENesin, prasterone (dhea), fluticasone propionate, magnesium, tamsulosin, dutasteride, tadalafiL, pentoxifylline, cyclobenzaprine, atorvastatin, meloxicam, albuterol, benzonatate, latanoprost, sildenafiL, metoprolol succinate, and ALPRAZolam.  No problem-specific Assessment & Plan notes found for this encounter.      No follow-ups on file.    Diagnoses and all orders for this visit:    Depression, unspecified depression type  -     sertraline (ZOLOFT) 100 MG tablet; Take 1 tablet (100 mg total) by mouth once daily.    Anxiety  -     ALPRAZolam (XANAX) 0.25 MG tablet; Take 1 tablet (0.25 mg  total) by mouth 3 (three) times daily as needed for Anxiety.      Medications Ordered This Encounter   Medications    ALPRAZolam (XANAX) 0.25 MG tablet     Sig: Take 1 tablet (0.25 mg total) by mouth 3 (three) times daily as needed for Anxiety.     Dispense:  20 tablet     Refill:  0    sertraline (ZOLOFT) 100 MG tablet     Sig: Take 1 tablet (100 mg total) by mouth once daily.     Dispense:  30 tablet     Refill:  11     The patient was instructed to stop the following meds:  Medications Discontinued During This Encounter   Medication Reason    sertraline (ZOLOFT) 50 MG tablet     ALPRAZolam (XANAX) 0.25 MG tablet Reorder     No orders of the defined types were placed in this encounter.      Medication List with Changes/Refills   New Medications    SERTRALINE (ZOLOFT) 100 MG TABLET    Take 1 tablet (100 mg total) by mouth once daily.   Current Medications    ALBUTEROL (PROVENTIL/VENTOLIN HFA) 90 MCG/ACTUATION INHALER    Inhale 2 puffs into the lungs.    ARGININE HCL, L-ARGININE, 1,000 MG TAB    Take by mouth once daily.    ATORVASTATIN (LIPITOR) 20 MG TABLET    TAKE 1 TABLET BY MOUTH ONCE DAILY    B COMPLEX VITAMINS TABLET    Take 1 tablet by mouth once daily.    BENZONATATE (TESSALON) 200 MG CAPSULE        CHOLECALCIFEROL, VITAMIN D3, 125 MCG (5,000 UNIT) TAB    Take 5,000 Units by mouth once daily.    CYCLOBENZAPRINE (FLEXERIL) 10 MG TABLET    Take 1 tablet (10 mg total) by mouth 3 (three) times daily as needed for Muscle spasms.    DUTASTERIDE (AVODART) 0.5 MG CAPSULE    Take 1 capsule (0.5 mg total) by mouth once daily.    FLUTICASONE PROPIONATE (FLONASE) 50 MCG/ACTUATION NASAL SPRAY    1 spray by Each Nostril route once daily.    GUAIFENESIN (MUCINEX) 600 MG 12 HR TABLET    Take 1,200 mg by mouth 2 (two) times daily.    IBUPROFEN (ADVIL,MOTRIN) 200 MG TABLET    Take 800 mg by mouth 3 (three) times daily.    KRILL  MG CAP    Take by mouth once daily.    LATANOPROST 0.005 % OPHTHALMIC SOLUTION     Place 1 drop into both eyes every evening.    MAGNESIUM 250 MG TAB    Take by mouth once.    MELOXICAM (MOBIC) 15 MG TABLET    Take 1 tablet (15 mg total) by mouth daily as needed for Pain.    METOPROLOL SUCCINATE (TOPROL-XL) 25 MG 24 HR TABLET    Take 1 tablet (25 mg total) by mouth once daily.    MULTIVITAMIN CAPSULE    Take 1 capsule by mouth once daily.    PENTOXIFYLLINE (TRENTAL) 400 MG TBSR        PRASTERONE, DHEA, 50 MG CAP    Take 50 mg by mouth.    SILDENAFIL (VIAGRA) 100 MG TABLET    Take 100 mg by mouth.    TADALAFIL (CIALIS) 5 MG TABLET    Take 0.5 tablets (2.5 mg total) by mouth daily as needed.    TAMSULOSIN (FLOMAX) 0.4 MG CAP    Take 1 capsule (0.4 mg total) by mouth once daily.   Changed and/or Refilled Medications    Modified Medication Previous Medication    ALPRAZOLAM (XANAX) 0.25 MG TABLET ALPRAZolam (XANAX) 0.25 MG tablet       Take 1 tablet (0.25 mg total) by mouth 3 (three) times daily as needed for Anxiety.    Take 1 tablet (0.25 mg total) by mouth 3 (three) times daily as needed for Anxiety.   Discontinued Medications    SERTRALINE (ZOLOFT) 50 MG TABLET    Take 1/2 po daily for 1 week, then 1 po daily      Medication List with Changes/Refills   New Medications    SERTRALINE (ZOLOFT) 100 MG TABLET    Take 1 tablet (100 mg total) by mouth once daily.       Start Date: 6/23/2023 End Date: 6/22/2024   Current Medications    ALBUTEROL (PROVENTIL/VENTOLIN HFA) 90 MCG/ACTUATION INHALER    Inhale 2 puffs into the lungs.       Start Date: 3/29/2023 End Date: --    ARGININE HCL, L-ARGININE, 1,000 MG TAB    Take by mouth once daily.       Start Date: --        End Date: --    ATORVASTATIN (LIPITOR) 20 MG TABLET    TAKE 1 TABLET BY MOUTH ONCE DAILY       Start Date: 1/27/2023 End Date: --    B COMPLEX VITAMINS TABLET    Take 1 tablet by mouth once daily.       Start Date: --        End Date: --    BENZONATATE (TESSALON) 200 MG CAPSULE           Start Date: 2/20/2023 End Date: --    CHOLECALCIFEROL,  VITAMIN D3, 125 MCG (5,000 UNIT) TAB    Take 5,000 Units by mouth once daily.       Start Date: --        End Date: --    CYCLOBENZAPRINE (FLEXERIL) 10 MG TABLET    Take 1 tablet (10 mg total) by mouth 3 (three) times daily as needed for Muscle spasms.       Start Date: 12/15/2022End Date: --    DUTASTERIDE (AVODART) 0.5 MG CAPSULE    Take 1 capsule (0.5 mg total) by mouth once daily.       Start Date: 12/17/2021End Date: --    FLUTICASONE PROPIONATE (FLONASE) 50 MCG/ACTUATION NASAL SPRAY    1 spray by Each Nostril route once daily.       Start Date: --        End Date: --    GUAIFENESIN (MUCINEX) 600 MG 12 HR TABLET    Take 1,200 mg by mouth 2 (two) times daily.       Start Date: --        End Date: --    IBUPROFEN (ADVIL,MOTRIN) 200 MG TABLET    Take 800 mg by mouth 3 (three) times daily.       Start Date: --        End Date: --    KRILL  MG CAP    Take by mouth once daily.       Start Date: --        End Date: --    LATANOPROST 0.005 % OPHTHALMIC SOLUTION    Place 1 drop into both eyes every evening.       Start Date: 2/20/2023 End Date: --    MAGNESIUM 250 MG TAB    Take by mouth once.       Start Date: --        End Date: --    MELOXICAM (MOBIC) 15 MG TABLET    Take 1 tablet (15 mg total) by mouth daily as needed for Pain.       Start Date: 3/16/2023 End Date: --    METOPROLOL SUCCINATE (TOPROL-XL) 25 MG 24 HR TABLET    Take 1 tablet (25 mg total) by mouth once daily.       Start Date: 5/26/2023 End Date: --    MULTIVITAMIN CAPSULE    Take 1 capsule by mouth once daily.       Start Date: --        End Date: --    PENTOXIFYLLINE (TRENTAL) 400 MG TBSR           Start Date: 9/22/2021 End Date: --    PRASTERONE, DHEA, 50 MG CAP    Take 50 mg by mouth.       Start Date: --        End Date: --    SILDENAFIL (VIAGRA) 100 MG TABLET    Take 100 mg by mouth.       Start Date: 11/28/2022End Date: --    TADALAFIL (CIALIS) 5 MG TABLET    Take 0.5 tablets (2.5 mg total) by mouth daily as needed.       Start Date:  12/17/2021End Date: --    TAMSULOSIN (FLOMAX) 0.4 MG CAP    Take 1 capsule (0.4 mg total) by mouth once daily.       Start Date: 12/17/2021End Date: --   Changed and/or Refilled Medications    Modified Medication Previous Medication    ALPRAZOLAM (XANAX) 0.25 MG TABLET ALPRAZolam (XANAX) 0.25 MG tablet       Take 1 tablet (0.25 mg total) by mouth 3 (three) times daily as needed for Anxiety.    Take 1 tablet (0.25 mg total) by mouth 3 (three) times daily as needed for Anxiety.       Start Date: 6/23/2023 End Date: --    Start Date: 6/7/2023  End Date: 6/23/2023   Discontinued Medications    SERTRALINE (ZOLOFT) 50 MG TABLET    Take 1/2 po daily for 1 week, then 1 po daily       Start Date: 6/7/2023  End Date: 6/23/2023            [facility]   Increase zoloft to 100 mg a day. F/u with me on effect in 2 weeks.  Refilled xanax for his anxiety prn.

## 2023-09-07 ENCOUNTER — PATIENT MESSAGE (OUTPATIENT)
Dept: ADMINISTRATIVE | Facility: OTHER | Age: 73
End: 2023-09-07
Payer: MEDICARE

## 2023-09-14 ENCOUNTER — PATIENT MESSAGE (OUTPATIENT)
Dept: FAMILY MEDICINE | Facility: CLINIC | Age: 73
End: 2023-09-14
Payer: MEDICARE

## 2023-12-27 ENCOUNTER — OFFICE VISIT (OUTPATIENT)
Dept: FAMILY MEDICINE | Facility: CLINIC | Age: 73
End: 2023-12-27
Payer: MEDICARE

## 2023-12-27 VITALS
WEIGHT: 217.88 LBS | HEART RATE: 75 BPM | HEIGHT: 70 IN | RESPIRATION RATE: 18 BRPM | TEMPERATURE: 98 F | DIASTOLIC BLOOD PRESSURE: 70 MMHG | OXYGEN SATURATION: 95 % | SYSTOLIC BLOOD PRESSURE: 123 MMHG | BODY MASS INDEX: 31.19 KG/M2

## 2023-12-27 DIAGNOSIS — I70.0 AORTIC ATHEROSCLEROSIS: ICD-10-CM

## 2023-12-27 DIAGNOSIS — N40.0 BENIGN PROSTATIC HYPERPLASIA, UNSPECIFIED WHETHER LOWER URINARY TRACT SYMPTOMS PRESENT: ICD-10-CM

## 2023-12-27 DIAGNOSIS — F10.980 ALCOHOL USE, UNSPECIFIED WITH ALCOHOL-INDUCED ANXIETY DISORDER: ICD-10-CM

## 2023-12-27 DIAGNOSIS — J45.50 SEVERE PERSISTENT ASTHMA, UNCOMPLICATED: ICD-10-CM

## 2023-12-27 DIAGNOSIS — R52 GENERALIZED BODY ACHES: Primary | ICD-10-CM

## 2023-12-27 DIAGNOSIS — E78.5 HYPERLIPIDEMIA, UNSPECIFIED HYPERLIPIDEMIA TYPE: ICD-10-CM

## 2023-12-27 DIAGNOSIS — F33.2 SEVERE EPISODE OF RECURRENT MAJOR DEPRESSIVE DISORDER, WITHOUT PSYCHOTIC FEATURES: ICD-10-CM

## 2023-12-27 LAB
INFLUENZA A, MOLECULAR: NEGATIVE
INFLUENZA B, MOLECULAR: NEGATIVE
SPECIMEN SOURCE: NORMAL

## 2023-12-27 PROCEDURE — 87502 INFLUENZA DNA AMP PROBE: CPT | Mod: PO | Performed by: NURSE PRACTITIONER

## 2023-12-27 PROCEDURE — 3074F SYST BP LT 130 MM HG: CPT | Mod: CPTII,S$GLB,, | Performed by: NURSE PRACTITIONER

## 2023-12-27 PROCEDURE — 1159F MED LIST DOCD IN RCRD: CPT | Mod: CPTII,S$GLB,, | Performed by: NURSE PRACTITIONER

## 2023-12-27 PROCEDURE — 3078F DIAST BP <80 MM HG: CPT | Mod: CPTII,S$GLB,, | Performed by: NURSE PRACTITIONER

## 2023-12-27 PROCEDURE — 1101F PT FALLS ASSESS-DOCD LE1/YR: CPT | Mod: CPTII,S$GLB,, | Performed by: NURSE PRACTITIONER

## 2023-12-27 PROCEDURE — 3008F BODY MASS INDEX DOCD: CPT | Mod: CPTII,S$GLB,, | Performed by: NURSE PRACTITIONER

## 2023-12-27 PROCEDURE — 3288F FALL RISK ASSESSMENT DOCD: CPT | Mod: CPTII,S$GLB,, | Performed by: NURSE PRACTITIONER

## 2023-12-27 PROCEDURE — 99999 PR PBB SHADOW E&M-EST. PATIENT-LVL V: CPT | Mod: PBBFAC,,, | Performed by: NURSE PRACTITIONER

## 2023-12-27 PROCEDURE — 1160F RVW MEDS BY RX/DR IN RCRD: CPT | Mod: CPTII,S$GLB,, | Performed by: NURSE PRACTITIONER

## 2023-12-27 PROCEDURE — 99214 OFFICE O/P EST MOD 30 MIN: CPT | Mod: S$GLB,,, | Performed by: NURSE PRACTITIONER

## 2023-12-27 PROCEDURE — 1125F AMNT PAIN NOTED PAIN PRSNT: CPT | Mod: CPTII,S$GLB,, | Performed by: NURSE PRACTITIONER

## 2023-12-27 RX ORDER — IPRATROPIUM BROMIDE AND ALBUTEROL SULFATE 2.5; .5 MG/3ML; MG/3ML
SOLUTION RESPIRATORY (INHALATION)
COMMUNITY
Start: 2023-10-03

## 2023-12-27 RX ORDER — FLUTICASONE PROPIONATE AND SALMETEROL 100; 50 UG/1; UG/1
1 POWDER RESPIRATORY (INHALATION) 2 TIMES DAILY
Qty: 60 EACH | Refills: 11 | Status: SHIPPED | OUTPATIENT
Start: 2023-12-27 | End: 2024-03-27 | Stop reason: SDUPTHER

## 2023-12-27 RX ORDER — ASPIRIN 81 MG/1
81 TABLET ORAL DAILY
COMMUNITY

## 2023-12-27 RX ORDER — LEVOTHYROXINE SODIUM 25 UG/1
35 TABLET ORAL
COMMUNITY

## 2023-12-27 RX ORDER — MIRTAZAPINE 7.5 MG/1
7.5 TABLET, FILM COATED ORAL NIGHTLY
COMMUNITY

## 2023-12-27 RX ORDER — MONTELUKAST SODIUM 10 MG/1
10 TABLET ORAL NIGHTLY
Qty: 90 TABLET | Refills: 3 | Status: SHIPPED | OUTPATIENT
Start: 2023-12-27 | End: 2024-12-21

## 2023-12-27 RX ORDER — PRAZOSIN HYDROCHLORIDE 2 MG/1
2 CAPSULE ORAL 4 TIMES DAILY
COMMUNITY

## 2024-01-23 PROBLEM — I70.0 AORTIC ATHEROSCLEROSIS: Status: ACTIVE | Noted: 2024-01-23

## 2024-01-23 PROBLEM — F10.980 ALCOHOL USE, UNSPECIFIED WITH ALCOHOL-INDUCED ANXIETY DISORDER: Status: ACTIVE | Noted: 2024-01-23

## 2024-01-23 PROBLEM — F33.2 SEVERE EPISODE OF RECURRENT MAJOR DEPRESSIVE DISORDER, WITHOUT PSYCHOTIC FEATURES: Status: ACTIVE | Noted: 2024-01-23

## 2024-01-23 PROBLEM — J45.50 SEVERE PERSISTENT ASTHMA, UNCOMPLICATED: Status: ACTIVE | Noted: 2024-01-23

## 2024-01-23 NOTE — PROGRESS NOTES
Subjective:       Patient ID: Raphael Nelson Jr. is a 73 y.o. male.    Chief Complaint: Follow-up (Follow up,medication update)    HPI    Follow up for medications for asthma. [facility]     Problem List Items Addressed This Visit          Psychiatric    Alcohol use, unspecified with alcohol-induced anxiety disorder    Overview     -history of alcohol use, depression, and anxiety  -[facility]           Severe episode of recurrent major depressive disorder, without psychotic features    Overview     -history of depression  -he is a retired    -symptoms worsen around other veterans  -[facility] is [facility]  -improvement in symptoms with medications  -denies suicidal thoughts or ideations            Pulmonary    Severe persistent asthma, uncomplicated    Overview     The patient presents with asthma.  The patient denies chest pain, palpitations, shortness of breath, difficulty breathing, and wheezing.  The patient feels that the pattern of symptoms is stable.  Current medications used to help asthma include:   Your Quick Relief Medication: (7000h ago through 1000h from now)          Stop Sig     albuterol (PROVENTIL/VENTOLIN HFA) 90 mcg/actuation inhaler        Sig: Inhale 2 puffs into the lungs.       -- Inhale 2 puffs into the lungs.            Your Controller Medications: (7000h ago through 1000h from now)          Stop Sig     montelukast (SINGULAIR) 10 mg tablet  Nightly        Sig: Take 1 tablet (10 mg total) by mouth every evening.       12/21/24 2359 Take 1 tablet (10 mg total) by mouth every evening.     fluticasone-salmeterol diskus inhaler 100-50 mcg  2 times daily        Sig: Inhale 1 puff into the lungs 2 (two) times daily. Controller       12/26/24 2359 Inhale 1 puff into the lungs 2 (two) times daily. Controller                    Relevant Medications    montelukast (SINGULAIR) 10 mg tablet     fluticasone-salmeterol diskus inhaler 100-50 mcg       Cardiac/Vascular    Hyperlipidemia    Overview     The cholesterol has been high and the ASCVD score is also high inducing the desire to treat this with mediciations.   Lab Results   Component Value Date    CHOL 157 12/15/2022    CHOL 138 12/17/2021    CHOL 204 (H) 09/23/2020     Lab Results   Component Value Date    HDL 46 12/15/2022    HDL 53 12/17/2021    HDL 50 09/23/2020     Lab Results   Component Value Date    LDLCALC 92.2 12/15/2022    LDLCALC 68 12/17/2021    LDLCALC 135.4 09/23/2020     Lab Results   Component Value Date    TRIG 94 12/15/2022    TRIG 89 12/17/2021    TRIG 93 09/23/2020     Lab Results   Component Value Date    CHOLHDL 29.3 12/15/2022    CHOLHDL 24.5 09/23/2020    CHOLHDL 25.3 03/12/2018   The 10-year ASCVD risk score (Alexis REYES, et al., 2019) is: 20.7%    Values used to calculate the score:      Age: 73 years      Sex: Male      Is Non- : No      Diabetic: No      Tobacco smoker: No      Systolic Blood Pressure: 123 mmHg      Is BP treated: No      HDL Cholesterol: 46 mg/dL      Total Cholesterol: 157 mg/dL    He did not use the lipitor and we discussed this score above and he understands now.  We will treat w 20 mg lipitor.   01/23/2024  The 10-year ASCVD risk score (Alexis REYES, et al., 2019) is: 20.7%    Values used to calculate the score:      Age: 73 years      Sex: Male      Is Non- : No      Diabetic: No      Tobacco smoker: No      Systolic Blood Pressure: 123 mmHg      Is BP treated: No      HDL Cholesterol: 46 mg/dL      Total Cholesterol: 157 mg/dL           Relevant Medications    atorvastatin (LIPITOR) 20 MG tablet    Aortic atherosclerosis       Renal/    BPH (benign prostatic hyperplasia)    Overview     The patient presents with BPH.  Denies difficulty voiding, diminished urinary stream, a feeling of hesitancy, straining to void, a feeling of incomplete voiding,  postvoid dribbling, urinary frequency, urgency, nocturia, dysuria and hematuria.  The last PSA level is below.  Current treatment has included dutasteride without improvement.  PSA, Screen (ng/mL)   Date Value   12/15/2022 0.39            Relevant Medications    dutasteride (AVODART) 0.5 mg capsule     Other Visit Diagnoses       Generalized body aches    -  Primary    Relevant Orders    Influenza A & B by Molecular (Completed)            Review of Systems   Constitutional:  Positive for activity change. Negative for unexpected weight change.   HENT:  Positive for hearing loss and rhinorrhea. Negative for ear pain and trouble swallowing.    Eyes: Negative.  Negative for pain, discharge and visual disturbance.   Respiratory:  Positive for chest tightness and wheezing. Negative for shortness of breath.    Cardiovascular:  Positive for palpitations.   Gastrointestinal:  Negative for blood in stool, constipation and diarrhea.   Endocrine: Positive for polydipsia.   Genitourinary:  Positive for difficulty urinating and urgency. Negative for dysuria, frequency and hematuria.   Skin:  Negative for color change.   Neurological:  Negative for dizziness.   Psychiatric/Behavioral:  Positive for dysphoric mood. Negative for confusion and sleep disturbance. The patient is not nervous/anxious.        Vitals:    12/27/23 0902   BP: 123/70   Pulse: 75   Resp: 18   Temp: 97.9 °F (36.6 °C)       Objective:     Current Outpatient Medications   Medication Sig Dispense Refill    albuterol (PROVENTIL/VENTOLIN HFA) 90 mcg/actuation inhaler Inhale 2 puffs into the lungs.      albuterol-ipratropium (DUO-NEB) 2.5 mg-0.5 mg/3 mL nebulizer solution USE 1 AMPULE IN NEBULIZER EVERY 6 HOURS AS NEEDED      arginine HCl, L-arginine, 1,000 mg Tab Take by mouth once daily.      aspirin (ECOTRIN) 81 MG EC tablet Take 81 mg by mouth once daily.      b complex vitamins tablet Take 1 tablet by mouth once daily.      benzonatate (TESSALON) 200 MG capsule        cholecalciferol, vitamin D3, 125 mcg (5,000 unit) Tab Take 5,000 Units by mouth once daily.      cyclobenzaprine (FLEXERIL) 10 MG tablet Take 1 tablet (10 mg total) by mouth 3 (three) times daily as needed for Muscle spasms. 30 tablet 11    fluticasone propionate (FLONASE) 50 mcg/actuation nasal spray 1 spray by Each Nostril route once daily.      guaiFENesin (MUCINEX) 600 mg 12 hr tablet Take 1,200 mg by mouth 2 (two) times daily.      ibuprofen (ADVIL,MOTRIN) 200 MG tablet Take 800 mg by mouth 3 (three) times daily.      krill oil 500 mg Cap Take by mouth once daily.      latanoprost 0.005 % ophthalmic solution Place 1 drop into both eyes every evening.      levothyroxine (SYNTHROID) 25 MCG tablet Take 35 mcg by mouth before breakfast.      magnesium 250 mg Tab Take by mouth once.      meloxicam (MOBIC) 15 MG tablet Take 1 tablet (15 mg total) by mouth daily as needed for Pain. 90 tablet 3    metoprolol succinate (TOPROL-XL) 25 MG 24 hr tablet Take 1 tablet (25 mg total) by mouth once daily. 90 tablet 3    mirtazapine (REMERON) 7.5 MG Tab Take 7.5 mg by mouth every evening. Pt taking 1/2 nightly      multivitamin capsule Take 1 capsule by mouth once daily.      pentoxifylline (TRENTAL) 400 mg TbSR       prasterone, dhea, 50 mg Cap Take 50 mg by mouth.      prazosin (MINIPRESS) 2 MG Cap Take 2 mg by mouth 4 (four) times daily. Pt takes 8 mg at bedtime      sildenafiL (VIAGRA) 100 MG tablet Take 100 mg by mouth.      tadalafiL (CIALIS) 5 MG tablet Take 0.5 tablets (2.5 mg total) by mouth daily as needed. 90 tablet 3    atorvastatin (LIPITOR) 20 MG tablet Take 1 tablet (20 mg total) by mouth once daily. 90 tablet 3    dutasteride (AVODART) 0.5 mg capsule Take 1 capsule (0.5 mg total) by mouth once daily. 90 capsule 3    fluticasone-salmeterol diskus inhaler 100-50 mcg Inhale 1 puff into the lungs 2 (two) times daily. Controller 60 each 11    montelukast (SINGULAIR) 10 mg tablet Take 1 tablet (10 mg total) by  mouth every evening. 90 tablet 3     No current facility-administered medications for this visit.       Physical Exam  Vitals and nursing note reviewed.   Constitutional:       General: He is not in acute distress.     Appearance: He is well-developed.   HENT:      Head: Normocephalic and atraumatic.   Eyes:      Pupils: Pupils are equal, round, and reactive to light.   Neck:      Vascular: No carotid bruit.   Cardiovascular:      Rate and Rhythm: Normal rate and regular rhythm.   Pulmonary:      Effort: Pulmonary effort is normal.      Breath sounds: Normal breath sounds.   Musculoskeletal:         General: Normal range of motion.      Cervical back: Normal range of motion and neck supple.   Skin:     General: Skin is warm and dry.      Findings: No rash.   Neurological:      Mental Status: He is alert and oriented to person, place, and time.   Psychiatric:         Thought Content: Thought content normal.         Flu is negative    Assessment:       1. Generalized body aches    2. Hyperlipidemia, unspecified hyperlipidemia type    3. Benign prostatic hyperplasia, unspecified whether lower urinary tract symptoms present    4. Alcohol use, unspecified with alcohol-induced anxiety disorder    5. Severe episode of recurrent major depressive disorder, without psychotic features    6. Severe persistent asthma, uncomplicated    7. Aortic atherosclerosis        Plan:   Generalized body aches  -     Influenza A & B by Molecular    Hyperlipidemia, unspecified hyperlipidemia type  -     atorvastatin (LIPITOR) 20 MG tablet; Take 1 tablet (20 mg total) by mouth once daily.  Dispense: 90 tablet; Refill: 3    Benign prostatic hyperplasia, unspecified whether lower urinary tract symptoms present  -     dutasteride (AVODART) 0.5 mg capsule; Take 1 capsule (0.5 mg total) by mouth once daily.  Dispense: 90 capsule; Refill: 3    Alcohol use, unspecified with alcohol-induced anxiety disorder  [facility]    Severe episode of recurrent major depressive disorder, without psychotic features  Continue current medications and [facility]    Severe persistent asthma, uncomplicated  -     montelukast (SINGULAIR) 10 mg tablet; Take 1 tablet (10 mg total) by mouth every evening.  Dispense: 90 tablet; Refill: 3  -     fluticasone-salmeterol diskus inhaler 100-50 mcg; Inhale 1 puff into the lungs 2 (two) times daily. Controller  Dispense: 60 each; Refill: 11    Aortic atherosclerosis  Continue atovastatin  Follow up with cardiology as scheduled      No follow-ups on file.    There are no Patient Instructions on file for this visit.

## 2024-01-26 RX ORDER — DUTASTERIDE 0.5 MG/1
0.5 CAPSULE, LIQUID FILLED ORAL DAILY
Qty: 90 CAPSULE | Refills: 3 | Status: SHIPPED | OUTPATIENT
Start: 2024-01-26

## 2024-01-26 RX ORDER — ATORVASTATIN CALCIUM 20 MG/1
20 TABLET, FILM COATED ORAL DAILY
Qty: 90 TABLET | Refills: 3 | Status: SHIPPED | OUTPATIENT
Start: 2024-01-26 | End: 2024-05-09

## 2024-03-27 DIAGNOSIS — M62.830 SPASM OF LUMBAR PARASPINOUS MUSCLE: Chronic | ICD-10-CM

## 2024-03-27 DIAGNOSIS — Z87.898 HISTORY OF ELEVATED PSA: ICD-10-CM

## 2024-03-27 DIAGNOSIS — J45.50 SEVERE PERSISTENT ASTHMA, UNCOMPLICATED: ICD-10-CM

## 2024-03-27 DIAGNOSIS — Z76.0 MEDICATION REFILL: ICD-10-CM

## 2024-03-28 DIAGNOSIS — M62.830 SPASM OF LUMBAR PARASPINOUS MUSCLE: Chronic | ICD-10-CM

## 2024-03-28 RX ORDER — FLUTICASONE PROPIONATE AND SALMETEROL 100; 50 UG/1; UG/1
1 POWDER RESPIRATORY (INHALATION) 2 TIMES DAILY
Qty: 60 EACH | Refills: 11 | Status: SHIPPED | OUTPATIENT
Start: 2024-03-28 | End: 2025-03-28

## 2024-03-28 RX ORDER — MELOXICAM 15 MG/1
15 TABLET ORAL DAILY PRN
Qty: 90 TABLET | Refills: 3 | Status: SHIPPED | OUTPATIENT
Start: 2024-03-28

## 2024-03-28 RX ORDER — TADALAFIL 5 MG/1
2.5 TABLET ORAL DAILY PRN
Qty: 45 TABLET | Refills: 0 | Status: SHIPPED | OUTPATIENT
Start: 2024-03-28

## 2024-03-28 RX ORDER — MELOXICAM 15 MG/1
15 TABLET ORAL DAILY PRN
Qty: 90 TABLET | Refills: 0 | Status: SHIPPED | OUTPATIENT
Start: 2024-03-28

## 2024-03-28 RX ORDER — CYCLOBENZAPRINE HCL 10 MG
10 TABLET ORAL 3 TIMES DAILY PRN
Qty: 30 TABLET | Refills: 11 | Status: SHIPPED | OUTPATIENT
Start: 2024-03-28

## 2024-03-28 RX ORDER — METOPROLOL SUCCINATE 25 MG/1
25 TABLET, EXTENDED RELEASE ORAL DAILY
Qty: 90 TABLET | Refills: 0 | Status: SHIPPED | OUTPATIENT
Start: 2024-03-28

## 2024-03-28 NOTE — TELEPHONE ENCOUNTER
No care due was identified.  [facility] Embedded Care Due Messages. Reference number: [id number].   3/28/2024 11:20:39 AM CDT

## 2024-03-28 NOTE — TELEPHONE ENCOUNTER
Refill Routing Note   Medication(s) are not appropriate for processing by [facility] for the following reason(s):        Non-participating provider  New or recently adjusted medication  Other( alternate pharmacy requested)    [facility] action(s):  Route        Medication Therapy Plan: Alternate pharmacy requested by patient . Last order was sent to [facility], Now requesting [facility]      Appointments  past 12m or future 3m with PCP    Date Provider   Last Visit   12/27/2023 Rayray Pedersen, NP   Next Visit   Visit date not found Rayray Pedersen, NP   ED visits in past 90 days: 0        Note composed:10:20 AM 03/28/2024

## 2024-03-28 NOTE — TELEPHONE ENCOUNTER
Care Due:                  Date            Visit Type   Department     Provider  --------------------------------------------------------------------------------                                ESTABLISHED                              PATIENT -    [facility]  Last Visit: 06-      VIRTUAL      MEDICINE       Kurt Davey  Next Visit: None Scheduled  None         None Found                                                            Last  Test          Frequency    Reason                     Performed    Due Date  --------------------------------------------------------------------------------    Office Visit  12 months..  meloxicam................  06- 06-    CBC.........  12 months..  meloxicam................  12-   12-    CMP.........  12 months..  meloxicam................  12-   12-    [facility] Embedded Care Due Messages. Reference number: [id number].   3/27/2024 9:30:29 PM CDT

## 2024-03-28 NOTE — TELEPHONE ENCOUNTER
Refill Routing Note   Medication(s) are not appropriate for processing by [facility] for the following reason(s):        Outside of protocol    [facility] action(s):  Route               Appointments  past 12m or future 3m with PCP    Date Provider   Last Visit   6/23/2023 Kurt Davey MD   Next Visit   3/27/2024 Kurt Davey MD   ED visits in past 90 days: 0        Note composed:7:01 AM 03/28/2024

## 2024-03-28 NOTE — TELEPHONE ENCOUNTER
Refill Routing Note   Medication(s) are not appropriate for processing by [facility] for the following reason(s):        Outside of protocol    [facility] action(s):  Route               Appointments  past 12m or future 3m with PCP    Date Provider   Last Visit   6/23/2023 Kurt Davey MD   Next Visit   Visit date not found Kurt Davey MD   ED visits in past 90 days: 0        Note composed:12:21 PM 03/28/2024

## 2024-03-28 NOTE — TELEPHONE ENCOUNTER
Refill Routing Note   Medication(s) are not appropriate for processing by [facility] for the following reason(s):        Outside of protocol  No active prescription written by provider    [facility] action(s):  Defer  Route  Approve        Medication Therapy Plan: Mobic is off protocol; Cialis last ordered over 2 years ago.      Appointments  past 12m or future 3m with PCP    Date Provider   Last Visit   6/23/2023 Kurt Davey MD   Next Visit   Visit date not found Kurt Davey MD   ED visits in past 90 days: 0        Note composed:10:23 AM 03/28/2024

## 2024-05-09 DIAGNOSIS — E78.5 HYPERLIPIDEMIA, UNSPECIFIED HYPERLIPIDEMIA TYPE: ICD-10-CM

## 2024-05-09 RX ORDER — ATORVASTATIN CALCIUM 20 MG/1
20 TABLET, FILM COATED ORAL
Qty: 90 TABLET | Refills: 0 | Status: SHIPPED | OUTPATIENT
Start: 2024-05-09

## 2024-05-09 NOTE — TELEPHONE ENCOUNTER
The patient is overdue to see me.     The patient's last visit with me was on 6/23/2023.  Arrange and appointment and schedule labs/immunizations needed in Health Maintenance to close the care gaps.  If appropriate, a virtual visit may be used for this.  I have refilled the requested medicine x 1.  Arrange health maintenance to be completed prior to visit if the patient is able to do that.  Health Maintenance Due   Topic Date Due    RSV Vaccine (Age 60+ and Pregnant patients) (1 - 1-dose 60+ series) Never done    Shingles Vaccine (2 of 3) 08/13/2015    COVID-19 Vaccine (5 - 2023-24 season) 09/01/2023

## 2024-05-09 NOTE — TELEPHONE ENCOUNTER
No care due was identified.  [facility] Embedded Care Due Messages. Reference number: [id number].   5/09/2024 10:50:35 AM CDT

## 2024-05-09 NOTE — TELEPHONE ENCOUNTER
Refill Routing Note   Medication(s) are not appropriate for processing by [facility] for the following reason(s):        Required labs outdated    [facility] action(s):  Defer               Appointments  past 12m or future 3m with PCP    Date Provider   Last Visit   6/23/2023 Kurt Davey MD   Next Visit   Visit date not found Kurt Davey MD   ED visits in past 90 days: 0        Note composed:2:44 PM 05/09/2024

## 2024-09-11 ENCOUNTER — PATIENT MESSAGE (OUTPATIENT)
Dept: FAMILY MEDICINE | Facility: CLINIC | Age: 74
End: 2024-09-11
Payer: MEDICARE

## 2024-11-21 DIAGNOSIS — Z76.0 MEDICATION REFILL: ICD-10-CM

## 2024-11-21 NOTE — TELEPHONE ENCOUNTER
Care Due:                  Date            Visit Type   Department     Provider  --------------------------------------------------------------------------------                                ESTABLISHED                              PATIENT -    [facility]  Last Visit: 06-      [facility]      MEDICINE       Kurt Davey  Next Visit: None Scheduled  None         None Found                                                            Last  Test          Frequency    Reason                     Performed    Due Date  --------------------------------------------------------------------------------    Office Visit  12 months..  atorvastatin, meloxicam,   06- 06-                             metoprolol, tadalafiL....    CBC.........  12 months..  meloxicam................  12-   12-    CMP.........  12 months..  atorvastatin, meloxicam..  12-   12-    Lipid Panel.  12 months..  atorvastatin.............  12-   12-    [facility] Embedded Care Due Messages. Reference number: [id number].   11/21/2024 12:02:39 PM CST

## 2024-11-21 NOTE — TELEPHONE ENCOUNTER
Refill Routing Note   Medication(s) are not appropriate for processing by [facility] for the following reason(s):        Patient not seen by provider within 15 months    [facility] action(s):  Defer   Requires appointment : Yes     Requires labs : Yes             Appointments  past 12m or future 3m with PCP    Date Provider   Last Visit   6/23/2023 Kurt Davey MD   Next Visit   Visit date not found Kurt Davey MD   ED visits in past 90 days: 0        Note composed:4:03 PM 11/21/2024

## 2024-11-22 RX ORDER — METOPROLOL SUCCINATE 25 MG/1
25 TABLET, EXTENDED RELEASE ORAL
Qty: 90 TABLET | Refills: 0 | Status: SHIPPED | OUTPATIENT
Start: 2024-11-22

## 2024-12-17 ENCOUNTER — PATIENT MESSAGE (OUTPATIENT)
Dept: FAMILY MEDICINE | Facility: CLINIC | Age: 74
End: 2024-12-17
Payer: MEDICARE

## 2025-01-24 ENCOUNTER — TELEPHONE (OUTPATIENT)
Dept: FAMILY MEDICINE | Facility: CLINIC | Age: 75
End: 2025-01-24
Payer: MEDICARE

## 2025-01-24 NOTE — TELEPHONE ENCOUNTER
----- Message from Renetta sent at 1/24/2025 12:29 PM CST -----  Contact: Raphael  Type:  Same Day Appointment Request    Caller is requesting a same day appointment.    Name of Caller:Raphael  When is the first available appointment?unknown  Symptoms:dizziness/respiratory issues/asthma/low-grade fever  Best Call Back Number:[phone]   Additional Information: Please give patient an immediate call back to assist.     Symptoms: Dizziness, Breathing Trouble  Outcome: Instruct patient to Call 911 NOW!  Reason: This is the only possible outcome for these symptoms    Thank you,  [facility]

## 2025-01-24 NOTE — TELEPHONE ENCOUNTER
Returned patient's call. Patient states that he is running low grade fever, heart rate is elevated to 120s and 130s at times and that his O2 Saturation has fluctuated from 90 to 92. Spoke with Dr. Davey and recommended patient go to the ER. Patient verbalized understanding.

## 2025-01-28 ENCOUNTER — TELEPHONE (OUTPATIENT)
Dept: FAMILY MEDICINE | Facility: CLINIC | Age: 75
End: 2025-01-28
Payer: MEDICARE

## 2025-02-07 ENCOUNTER — OFFICE VISIT (OUTPATIENT)
Dept: FAMILY MEDICINE | Facility: CLINIC | Age: 75
End: 2025-02-07
Payer: MEDICARE

## 2025-02-07 VITALS
BODY MASS INDEX: 29.26 KG/M2 | SYSTOLIC BLOOD PRESSURE: 130 MMHG | HEIGHT: 71 IN | DIASTOLIC BLOOD PRESSURE: 60 MMHG | WEIGHT: 209 LBS | HEART RATE: 92 BPM

## 2025-02-07 DIAGNOSIS — J18.9 PNEUMONIA DUE TO INFECTIOUS ORGANISM, UNSPECIFIED LATERALITY, UNSPECIFIED PART OF LUNG: Primary | ICD-10-CM

## 2025-02-07 DIAGNOSIS — F33.2 SEVERE EPISODE OF RECURRENT MAJOR DEPRESSIVE DISORDER, WITHOUT PSYCHOTIC FEATURES: ICD-10-CM

## 2025-02-07 DIAGNOSIS — R00.0 TACHYCARDIA: ICD-10-CM

## 2025-02-07 DIAGNOSIS — I10 HYPERTENSION, UNSPECIFIED TYPE: ICD-10-CM

## 2025-02-07 DIAGNOSIS — I50.32 CHRONIC DIASTOLIC CONGESTIVE HEART FAILURE: ICD-10-CM

## 2025-02-07 DIAGNOSIS — J45.909 ASTHMA, UNSPECIFIED ASTHMA SEVERITY, UNSPECIFIED WHETHER COMPLICATED, UNSPECIFIED WHETHER PERSISTENT: ICD-10-CM

## 2025-02-07 DIAGNOSIS — F10.980 ALCOHOL USE, UNSPECIFIED WITH ALCOHOL-INDUCED ANXIETY DISORDER: ICD-10-CM

## 2025-02-07 DIAGNOSIS — Z76.0 MEDICATION REFILL: ICD-10-CM

## 2025-02-07 DIAGNOSIS — J45.50 SEVERE PERSISTENT ASTHMA, UNCOMPLICATED: ICD-10-CM

## 2025-02-07 DIAGNOSIS — E03.9 HYPOTHYROIDISM, UNSPECIFIED TYPE: ICD-10-CM

## 2025-02-07 PROCEDURE — 3078F DIAST BP <80 MM HG: CPT | Mod: CPTII,95,, | Performed by: FAMILY MEDICINE

## 2025-02-07 PROCEDURE — G2211 COMPLEX E/M VISIT ADD ON: HCPCS | Mod: 95,,, | Performed by: FAMILY MEDICINE

## 2025-02-07 PROCEDURE — 1159F MED LIST DOCD IN RCRD: CPT | Mod: CPTII,95,, | Performed by: FAMILY MEDICINE

## 2025-02-07 PROCEDURE — 3075F SYST BP GE 130 - 139MM HG: CPT | Mod: CPTII,95,, | Performed by: FAMILY MEDICINE

## 2025-02-07 PROCEDURE — 98005 SYNCH AUDIO-VIDEO EST LOW 20: CPT | Mod: 95,,, | Performed by: FAMILY MEDICINE

## 2025-02-07 PROCEDURE — 1160F RVW MEDS BY RX/DR IN RCRD: CPT | Mod: CPTII,95,, | Performed by: FAMILY MEDICINE

## 2025-02-07 RX ORDER — METOPROLOL SUCCINATE 25 MG/1
25 TABLET, EXTENDED RELEASE ORAL DAILY
Qty: 90 TABLET | Refills: 3 | Status: SHIPPED | OUTPATIENT
Start: 2025-02-07

## 2025-02-07 NOTE — PROGRESS NOTES
Primary Care Telemedicine Note    The patient location is:  Patient Home   The chief complaint leading to consultation is: pneumonia follow up after hospitalization  Total time spent with patient: 25 min    Visit type: Virtual visit with synchronous audio and video  Each patient to whom he or she provides medical services by telemedicine is:  (1) informed of the relationship between the physician and patient and the respective role of any other health care provider with respect to management of the patient; and (2) notified that he or she may decline to receive medical services by telemedicine and may withdraw from such care at any time.    Subjective:      Patient ID: Raphael Nelson Jr. is a 74 y.o. male.    Chief Complaint: f/u on hospitalization.    The patient was recently hospitalized at [facility]  for pneumonia.  The discharge summary from the hospitalization is copied below for reference and completeness.      Transitional Care Note    Family and/or Caretaker present at visit?  No.  Diagnostic tests reviewed/disposition: No diagnosic tests pending after this hospitalization.  Disease/illness education: he understands what he had  Home health/community services discussion/referrals: Patient does not have home health established from hospital visit.  They do not need home health.  If needed, we will set up home health for the patient.   Establishment or re-establishment of referral orders for community resources: No other necessary community resources.   Discussion with other health care providers: No discussion with other health care providers necessary.   Patient Care Team:  Kurt Davey MD as PCP - General (Family Medicine)  Fern Khalil MD as Consulting Physician (Gastroenterology)  DISCHARGE SUMMARY:  Discharge Summaries  - documented in this encounter   Marcy Patel MD - 01/26/2025 11:21 AM CST  Formatting of this note is different from the original.  [facility] DISCHARGE  SUMMARY    Patient ID:  Raphael DELGADILLO Gravity  [id number]  74 y.o.  1950    Admit Date:  1/24/2025 3:47 PM    Discharge Date:  Discharge Today: 1/26/2025    Admitting Physician:  Dr. Herb Welch MD    Current Attending:  Marcy Patel MD    Consultants/Treatment Team:    Reason for Admission/Admission Diagnoses:  Present on Admission:  Sepsis due to pneumonia (HCC)  Asthma without acute exacerbation    Discharge Diagnoses:  Active Hospital Problems  Diagnosis Date Noted  Sepsis due to pneumonia (HCC) 01/24/2025  Hyponatremia 01/24/2025  Pneumonia of both lower lobes 01/24/2025  PTSD (post-traumatic stress disorder) 01/24/2025  BPH (benign prostatic hyperplasia) 01/24/2025  Asthma without acute exacerbation 01/24/2025    Resolved Hospital Problems    History of Present Illness:  74-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 Has been taking Medrol Dosepak and oral Zithromax over the last 1 week, with no significant improvement. He presented to an urgent care facility, and was directed to the ED for further evaluation. CXR reveals a right middle lobe pneumonia, CT chest negative for PE, but reveals bilateral basilar infiltrates. Patient reported fever and chills at home. Currently afebrile in the ED. Remains tachycardic HR 92-1 07. Patient reports his SaO2 was 88% on room air at home, currently in the mid 90s on room air. Not requiring supplemental oxygen. Laboratory workup pertinent for elevated WBC of 24,000, sodium 135. Rest of the laboratory workup unremarkable. COVID-19, RSV, influenza negative. Received IV levofloxacin in the ED. Will require hospitalization for sepsis secondary to bibasilar pneumonia.    Hospital Course and Treatment:  Admission Information    Date & Time  1/24/2025 Provider  Marcy Patel MD Department  [facility] Ortho/Neurosurgery Dept.  Phone  [phone]        Patient admitted for sepsis due to bibasilar pneumonia. Received IV Levaquin in ER and then antibiotics adjusted to IV Zithromax/Rocephin. Responded well to IV antibiotics and symptoms improving by day of discharge on 1/26. Discharged on PO Omnicef x 1 week.    I discussed with the patient disease process and treatment.    I have personally seen and examined the patient, Raphael Nelson, in a face to face encounter on the date of discharge.    He is cleared for discharge with instructions to follow up as directed.  Total time in the care and discharge planning of this patient was greater than 30 minutes.    Significant Diagnostic Studies:  Recent Imaging and Procedure Results    Procedure Component Value Ref Range Date/Time  CT Chest W Contrast [[id number]] Collected: 01/24/2025 2026  Updated: 01/24/2025 2035  Narrative:  Indication: nodule/mass/consolidation    Technique: Helical CT images were obtained through the chest after administration of intravenous contrast. Automated exposure control was utilized for radiation dose reduction.    Comparison: None    Findings:  Heart size is normal without effusion. There is moderate aortic and branch vessel atherosclerosis. Limited images of the abdomen demonstrate colonic interposition. There is no mediastinal or hilar adenopathy. There is no axillary adenopathy. Pulmonary  arterial system is unremarkable.    There is bilateral lower lobe consolidation consistent with multifocal pneumonia. There is bibasilar atelectasis. Trachea and mainstem bronchi are patent. No aggressive osseous lesion.    Impression:  Multifocal pneumonia    Electronically signed by Brando Thompson MD on 1/24/2025 8:31 PM          Surgical Procedures during this visit:    Patient's Condition On Discharge:  Stable    Physical Exam  Constitutional:  Appearance: He is well-developed. He is not diaphoretic.  HENT:  Head: Normocephalic and atraumatic.  Mouth/Throat:  Pharynx: No  oropharyngeal exudate.  Eyes:  General: No scleral icterus.  Conjunctiva/sclera: Conjunctivae normal.  Pupils: Pupils are equal, round, and reactive to light.  Neck:  Thyroid: No thyromegaly.  Vascular: No JVD.  Trachea: No tracheal deviation.  Cardiovascular:  Rate and Rhythm: Normal rate and regular rhythm.  Heart sounds: Normal heart sounds. No murmur heard.  No friction rub. No gallop.  Pulmonary:  Effort: Pulmonary effort is normal. No respiratory distress.  Breath sounds: Normal breath sounds. No wheezing.  Abdominal:  General: Bowel sounds are normal. There is no distension.  Palpations: Abdomen is soft.  Tenderness: There is no abdominal tenderness.  Musculoskeletal:  General: No tenderness. Normal range of motion.  Cervical back: Normal range of motion.  Lymphadenopathy:  Cervical: No cervical adenopathy.  Skin:  General: Skin is warm and dry.  Findings: No rash.  Neurological:  Mental Status: He is alert and oriented to person, place, and time.  Cranial Nerves: No cranial nerve deficit.  Psychiatric:  Behavior: Behavior normal.    Discharge Disposition:  Order for Discharge (From admission, onward)    Start Ordered  01/26/25 1122 Discharge Disposition to: Home or Self Care Once  Expected Discharge Date: 01/26/25    Question: Discharge Disposition to Answer: Home or Self Care  01/26/25 1121                Discharge Orders:    Future Appointments:    Immunizations Administered for This Admission    No immunizations given this admission.          Medication List      START taking these medications    cefdinir 300 MG Cap capsule  Commonly known as: OMNICEF  Take 1 capsule (300 mg total) by mouth 2 (two) times daily for 7 days          CONTINUE taking these medications    arginine HCl (L-arginine) 1,000 mg Tab    cholecalciferol (vitamin D3) 125 mcg (5,000 unit) Tab tablet    Cialis 5 MG Tab tablet  Generic drug: tadalafiL    cyclobenzaprine 10 MG Tab tablet  Commonly known as: FLEXERIL    DHEA 50 mg  Cap  Generic drug: prasterone (dhea)    dutasteride 0.5 mg Cap capsule  Commonly known as: AVODART    fluticasone propionate 50 mcg/actuation Spsn nasal spray  Commonly known as: FLONASE    ibuprofen 200 MG Tab tablet  Commonly known as: ADVIL    krill oil 500 mg Cap    magnesium 250 mg Tab    meloxicam 15 MG Tab tablet  Commonly known as: MOBIC    metoprolol succinate 25 MG Tb24 24 hr tablet  Commonly known as: TOPROL-XL  TAKE 1 DAILY FOR BLOOD PRESSURE    multivitamin Cap capsule    naproxen 500 MG Tab tablet  Commonly known as: NAPROSYN    sildenafiL 100 MG Tab tablet  Commonly known as: VIAGRA    TURMERIC ORAL    VITAMIN B COMPLEX ORAL    vitamin C 1000 MG Tab tablet  Generic drug: ascorbic acid (vitamin C)    zinc gluconate 50 mg Tab tablet          ASK your doctor about these medications    aspirin 81 MG Tbec EC tablet  Commonly known as: ECOTRIN  Take 1 tablet (81 mg total) by mouth daily            Where to Get Your Medications      These medications were sent to [id number] [address] [address]    Phone: [phone]  cefdinir 300 MG Cap capsule      Discharge Orders    Future Labs/Procedures Expected by Expires  Activity as tolerated As directed  Diet (Select type) Regular As directed  Questions:  Diet Type: Regular  Restriction(s):  Solid Consistency:  Liquid Consistency:  Sodium Restriction:  Fluid restriction:  Follow-up with PCP As directed  Questions:  Schedule for:  when:          Electronically signed by Marcy Patel MD at 01/26/2025 11:23 AM CST   Medications at Time of Discharge  - documented as of this encounter   Medications at Time of Discharge  Medication Sig Dispense Quantity Refills Last Filled Start Date End Date   arginine HCl, L-arginine, 1,000 mg Tab  Take 500 mg by mouth daily             ascorbic acid, vitamin C, (VITAMIN C) 1000 MG tablet  Take 1 tablet (1,000 mg total) by mouth daily             cefdinir  (OMNICEF) 300 MG Cap capsule  Take 1 capsule (300 mg total) by mouth 2 (two) times daily for 7 days 14 capsule      01/26/2025 02/02/2025   cholecalciferol, vitamin D3, 5,000 unit tablet  Take 1 tablet (5,000 Units total) by mouth daily             cyclobenzaprine (FLEXERIL) 10 MG Tab tablet  Take 1 tablet (10 mg total) by mouth 3 (three) times daily as needed       02/03/2021     dutasteride (AVODART) 0.5 mg capsule  Take 1 capsule (0.5 mg total) by mouth 3x a week       07/24/2018     fluticasone propionate (FLONASE) 50 mcg/actuation SpSn nasal spray  1 spray by Nasal route daily             ibuprofen (ADVIL,MOTRIN) 200 MG tablet  Take 400 mg by mouth as needed             krill oil 500 mg Cap  Take 500 mg by mouth daily             magnesium 250 mg Tab  Take by mouth once             meloxicam (MOBIC) 15 MG tablet  Take 1 tablet (15 mg total) by mouth daily             metoprolol succinate (TOPROL-XL) 25 MG 24 hr tablet  TAKE 1 DAILY FOR BLOOD PRESSURE 30 tablet  4   01/08/2020     multivitamin capsule  Take 1 capsule by mouth daily             naproxen (NAPROSYN) 500 MG tablet  Take 400 mg by mouth as needed             prasterone, dhea, (DHEA) 50 mg Cap  Take 50 mg by mouth daily             sildenafiL (VIAGRA) 100 MG Tab tablet          09/27/2021     tadalafil (CIALIS) 5 MG tablet  Take 5 mg by mouth daily       04/04/2015     TURMERIC ORAL  Take by mouth             VITAMIN B COMPLEX ORAL  Take 1 tablet by mouth daily             zinc gluconate 50 mg Tab tablet  Take 1 tablet (50 mg total) by mouth daily               Ordered Prescriptions  - documented in this encounter  Reconcile with Patient's Chart  Ordered Prescriptions  Prescription Sig Dispense Quantity Refills Last Filled Start Date End Date   cefdinir (OMNICEF) 300 MG Cap capsule  Take 1 capsule (300 mg total) by mouth 2 (two) times daily for 7 days 14 capsule      01/26/2025 02/02/2025       History of Present Illness    CHIEF COMPLAINT:  Patient  presents today for follow up after hospitalization for pneumonia.    HISTORY OF PRESENT ILLNESS:  He reports illness onset one month ago, [facility] Dose[facility]. He subsequently developed fever, chills, and SpO2 of 88% on room air at home, leading to hospitalization at [facility] for bilateral pneumonia. He has completed the prescribed course of Omnicef post-discharge.    CARDIOVASCULAR:  He experiences episodes of tachycardia with heart rates of 120-140 BPM, accompanied by shortness of breath and palpitations. He reports exercise intolerance with increased heart rate upon minimal exertion such as climbing stairs, occasionally leading to dizziness. His current blood pressure is approximately 130/60. Recent cardiac workup, including chemical stress test and angiogram, was unremarkable except for myocardial bridge.    MEDICAL HISTORY:  He has asthma and [facility]. He was diagnosed with hypothyroidism approximately one year ago, with normal thyroid function tests since starting Synthroid 25mcg. He has PTSD which has worsened since long-term due to decreased mental occupation.    CURRENT MEDICATIONS:  He takes Metoprolol 25mg, Cialis for BPH, Synthroid 25mcg, Singulair, Meloxicam, Flexeril, Remeron, Flonase, and albuterol as needed.    SOCIAL HISTORY:  He splits time living between [place] and current location, and reports social alcohol consumption when going out.      ROS:  General: +fever, +chills, -fatigue, -weight gain, -weight loss, -change in weight  Eyes: -vision changes, -redness, -discharge  ENT: -ear pain, -nasal congestion, -sore throat  Cardiovascular: -chest pain, +palpitations, -lower extremity edema  Respiratory: -cough, +shortness of breath, -wheezing  Gastrointestinal: -abdominal pain, -nausea, -vomiting, -diarrhea, -constipation, -blood in stool  Genitourinary: -dysuria, -hematuria, -frequency  Musculoskeletal: -joint pain,  -muscle pain  Skin: -rash, -lesion  Neurological: -headache, +dizziness, -numbness, -tingling  Psychiatric: +anxiety, -depression, -sleep difficulty         Problem List Items Addressed This Visit       Alcohol use, unspecified with alcohol-induced anxiety disorder    Overview     -history of alcohol use, depression, and anxiety  -[facility]           Asthma    Chronic diastolic congestive heart failure    Hypertension    Hypothyroidism    Severe episode of recurrent major depressive disorder, without psychotic features    Overview     -history of depression  -he is a retired    -symptoms worsen around other veterans  -he is [facility]  -improvement in symptoms with medications  -denies suicidal thoughts or ideations         Severe persistent asthma, uncomplicated    Overview     The patient presents with asthma.  The patient denies chest pain, palpitations, shortness of breath, difficulty breathing, and wheezing.  The patient feels that the pattern of symptoms is stable.  Current medications used to help asthma include:   Your Quick Relief Medication: (7000h ago through 1000h from now)          Stop Sig     albuterol (PROVENTIL/VENTOLIN HFA) 90 mcg/actuation inhaler        Sig: Inhale 2 puffs into the lungs.       -- Inhale 2 puffs into the lungs.            Your Controller Medications: (7000h ago through 1000h from now)          Stop Sig     montelukast (SINGULAIR) 10 mg tablet  Nightly        Sig: Take 1 tablet (10 mg total) by mouth every evening.       12/21/24 2359 Take 1 tablet (10 mg total) by mouth every evening.     fluticasone-salmeterol diskus inhaler 100-50 mcg  2 times daily        Sig: Inhale 1 puff into the lungs 2 (two) times daily. Controller       12/26/24 2359 Inhale 1 puff into the lungs 2 (two) times daily. Controller                    Tachycardia    Overview     Patient with complaints of palpitations and tachycardia,  presumably paroxysmal atrial fibrillation.  Event monitor showed no evidence of atrial fibrillation, sinus tachycardia as cause of patient's palpitations. Patient started on Toprol-XL 25 mg daily    Last Assessment & Plan:   Patient has had no significant further palpitations on Toprol. Continue current medicine regimen. Okay to decrease aspirin to 81 mg daily         Relevant Medications    metoprolol succinate (TOPROL-XL) 25 MG 24 hr tablet     Other Visit Diagnoses       Pneumonia due to infectious organism, unspecified laterality, unspecified part of lung    -  Primary    Relevant Orders    X-Ray Chest PA And Lateral    Medication refill        Relevant Medications    metoprolol succinate (TOPROL-XL) 25 MG 24 hr tablet            Past Medical History:  Past Medical History:   Diagnosis Date    Allergy     Anxiety     Asthma     BPH (benign prostatic hyperplasia)     Cataract     GERD (gastroesophageal reflux disease)     Hyperlipidemia 12/05/2017    The cholesterol has been high and the ASCVD score is also high inducing the desire to treat this with mediciations.  Lab Results Component Value Date  CHOL 212 (H) 12/05/2017  CHOL 184 05/14/2015  Lab Results Component Value Date  HDL 54 12/05/2017  HDL 47 05/14/2015  Lab Results Component Value Date  LDLCALC 143.0 12/05/2017  LDLCALC 118.4 05/14/2015  Lab Results Component Value Date  TRIG 75 12/    Hypothyroidism     PTSD (post-traumatic stress disorder)     Rotator cuff disorder     Sinus tachycardia      Past Surgical History:   Procedure Laterality Date    ADENOIDECTOMY      APPENDECTOMY      COLONOSCOPY  3/1/2013    was normal.    COLONOSCOPY N/A 2/14/2023    Procedure: COLONOSCOPY;  Surgeon: Kurt Davey MD;  [facility];  Service: Endoscopy;  Laterality: N/A;    TONSILLECTOMY       Review of patient's allergies indicates:   Allergen Reactions    Codeine      Itching    Meperidine Itching     Current Outpatient Medications on File Prior to Visit    Medication Sig Dispense Refill    albuterol (PROVENTIL/VENTOLIN HFA) 90 mcg/actuation inhaler Inhale 2 puffs into the lungs.      albuterol-ipratropium (DUO-NEB) 2.5 mg-0.5 mg/3 mL nebulizer solution USE 1 AMPULE IN NEBULIZER EVERY 6 HOURS AS NEEDED      arginine HCl, L-arginine, 1,000 mg Tab Take by mouth once daily.      aspirin (ECOTRIN) 81 MG EC tablet Take 81 mg by mouth once daily.      b complex vitamins tablet Take 1 tablet by mouth once daily.      benzonatate (TESSALON) 200 MG capsule       cholecalciferol, vitamin D3, 125 mcg (5,000 unit) Tab Take 5,000 Units by mouth once daily.      cyclobenzaprine (FLEXERIL) 10 MG tablet Take 1 tablet (10 mg total) by mouth 3 (three) times daily as needed for Muscle spasms. 30 tablet 11    fluticasone propionate (FLONASE) 50 mcg/actuation nasal spray 1 spray by Each Nostril route once daily.      guaiFENesin (MUCINEX) 600 mg 12 hr tablet Take 1,200 mg by mouth 2 (two) times daily.      krill oil 500 mg Cap Take by mouth once daily.      levothyroxine (SYNTHROID) 25 MCG tablet Take 35 mcg by mouth before breakfast.      magnesium 250 mg Tab Take by mouth once.      meloxicam (MOBIC) 15 MG tablet TAKE 1 TABLET(15 MG) BY MOUTH DAILY AS NEEDED FOR PAIN 90 tablet 3    mirtazapine (REMERON) 7.5 MG Tab Take 7.5 mg by mouth every evening. Pt taking 1/2 nightly      montelukast (SINGULAIR) 10 mg tablet TAKE 1 TABLET(10 MG) BY MOUTH EVERY EVENING 90 tablet 0    multivitamin capsule Take 1 capsule by mouth once daily.      prasterone, dhea, 50 mg Cap Take 50 mg by mouth.      sildenafiL (VIAGRA) 100 MG tablet Take 100 mg by mouth.      tadalafiL (CIALIS) 5 MG tablet Take 0.5 tablets (2.5 mg total) by mouth daily as needed. 45 tablet 0    [DISCONTINUED] atorvastatin (LIPITOR) 20 MG tablet TAKE 1 TABLET BY MOUTH ONCE DAILY 90 tablet 0    [DISCONTINUED] dutasteride (AVODART) 0.5 mg capsule Take 1 capsule (0.5 mg total) by mouth once daily. 90 capsule 3    [DISCONTINUED]  fluticasone-salmeterol diskus inhaler 100-50 mcg Inhale 1 puff into the lungs 2 (two) times daily. Controller 60 each 11    [DISCONTINUED] ibuprofen (ADVIL,MOTRIN) 200 MG tablet Take 800 mg by mouth 3 (three) times daily.      [DISCONTINUED] latanoprost 0.005 % ophthalmic solution Place 1 drop into both eyes every evening.      [DISCONTINUED] meloxicam (MOBIC) 15 MG tablet Take 1 tablet (15 mg total) by mouth daily as needed for Pain. 90 tablet 0    [DISCONTINUED] metoprolol succinate (TOPROL-XL) 25 MG 24 hr tablet TAKE 1 TABLET BY MOUTH ONCE DAILY 90 tablet 0    [DISCONTINUED] pentoxifylline (TRENTAL) 400 mg TbSR       [DISCONTINUED] prazosin (MINIPRESS) 2 MG Cap Take 2 mg by mouth 4 (four) times daily. Pt takes 8 mg at bedtime       No current facility-administered medications on file prior to visit.     Social History     Socioeconomic History    Marital status:      Spouse name: Cynthia    Number of children: 5   Tobacco Use    Smoking status: Never    Smokeless tobacco: Never   Substance and Sexual Activity    Alcohol use: Yes     Comment: Socially    Drug use: Never    Sexual activity: Yes     Partners: Female     Social Drivers of Health     Financial Resource Strain: Low Risk  (1/31/2025)    Overall Financial Resource Strain (CARDIA)     Difficulty of Paying Living Expenses: Not hard at all   Food Insecurity: No Food Insecurity (1/31/2025)    Hunger Vital Sign     Worried About Running Out of Food in the Last Year: Never true     Ran Out of Food in the Last Year: Never true   Transportation Needs: No Transportation Needs (1/24/2025)    [facility]    PRAPARE - Transportation     Lack of Transportation (Medical): No     Lack of Transportation (Non-Medical): No   Physical Activity: Inactive (1/31/2025)    Exercise Vital Sign     Days of Exercise per Week: 0 days     Minutes of Exercise per Session: 0 min   Stress: Stress Concern Present (1/31/2025)    [employer] [place] [employer]  "Occupational Health - Occupational Stress Questionnaire     Feeling of Stress : Very much   Housing Stability: Unknown (1/31/2025)    Housing Stability Vital Sign     Unable to Pay for Housing in the Last Year: No     Family History   Problem Relation Name Age of Onset    Heart disease Mother      COPD Mother      Diabetes Father      Heart disease Father         Review of Systems   Constitutional:  Positive for fever.   HENT:  Positive for rhinorrhea. Negative for ear pain and sore throat.    Respiratory:  Positive for shortness of breath and wheezing.    Cardiovascular:  Positive for palpitations. Negative for chest pain and leg swelling.   Gastrointestinal:  Negative for abdominal pain and vomiting.   Musculoskeletal:  Positive for neck pain.   Skin:  Negative for rash.   Neurological:  Negative for headaches.       Objective:     /60   Pulse 92   Ht 5' 11" (1.803 m)   Wt 94.8 kg (209 lb)   BMI 29.15 kg/m²     Physical Exam  Constitutional:       General: He is not in acute distress.     Appearance: He is well-developed. He is not diaphoretic.   Pulmonary:      Effort: Pulmonary effort is normal. No respiratory distress.   Neurological:      Mental Status: He is alert and oriented to person, place, and time.   Psychiatric:         Behavior: Behavior normal.         Thought Content: Thought content normal.         Judgment: Judgment normal.       Assessment:     1. Pneumonia due to infectious organism, unspecified laterality, unspecified part of lung    2. Asthma, unspecified asthma severity, unspecified whether complicated, unspecified whether persistent    3. Hypertension, unspecified type    4. Tachycardia    5. Hypothyroidism, unspecified type    6. Chronic diastolic congestive heart failure    7. Alcohol use, unspecified with alcohol-induced anxiety disorder    8. Severe episode of recurrent major depressive disorder, without psychotic features    9. Severe persistent asthma, uncomplicated    10. " Medication refill        Plan:   Assessment & Plan    IMPRESSION:  - Reviewed discharge summary from recent hospitalization, noting bilateral infiltrates on CT and treatment with IV antibiotics  - Considered possible causes of reported tachycardia, including sepsis during recent illness, underlying cardiac issues, and medication side effects  - Noted history of negative cardiac workup, including clear angiogram, but persistence of tachycardia symptoms  - Will increase metoprolol dose due to adequate blood pressure and ongoing tachycardia symptoms  - Considered thyroid function as potential contributor to tachycardia  - Acknowledged Dr. Fredrick Archer's diagnosis of diastolic congestive heart failure, though patient was unaware  - Recognized potential link between alcohol use and heart rate irregularities    ALCOHOL USE, UNSPECIFIED WITH ALCOHOL-INDUCED ANXIETY DISORDER:  - Discussed the potential link between alcohol consumption and heart rate irregularities, particularly the day after drinking.  - Advised the patient to monitor alcohol consumption due to its potential effects on heart rate.  - Noted increased alcohol consumption reported by the patient, though not daily.    SEVERE EPISODE OF RECURRENT MAJOR DEPRESSIVE DISORDER, WITHOUT PSYCHOTIC FEATURES:  - Acknowledged the patient's mental health concerns, particularly the worsening of PTSD symptoms after intermediate.  - Noted that the patient is prescribed Mirtazapine (Remeron) for depression but does not take it consistently.  - Encouraged consistent medication adherence for optimal management of depressive symptoms.    UNSPECIFIED DIASTOLIC (CONGESTIVE) HEART FAILURE:  - Instructed the patient to clarify congestive heart failure diagnosis with Dr. Fredrick Archer at the upcoming appointment.  - Noted patient's report of exercise intolerance and tachycardia (120-140 bpm) with minimal activity, causing dyspnea.  - Current vital signs: pulse rate 92 bpm, blood pressure  130/60.  - Reviewed previous diagnosis of unspecified diastolic congestive heart failure by Dr. Wendi Archer.  - Increased Metoprolol dose from 25mg to 50mg for tachycardia management.  - Scheduled follow-up with cardiologist Dr. Wendi Archer for further evaluation.    SEVERE PERSISTENT ASTHMA, UNCOMPLICATED:  - Noted patient's history of asthma and recent hospitalization for bilateral pneumonia with associated symptoms: fever, chills, tachycardia, and hypoxemia (88% on room air).  - Ordered follow-up chest XR in 6 weeks to check for underlying conditions.  - Continued current treatment regimen: albuterol or Proventil as needed and Flonase.  - Noted ongoing care from [facility] pulmon[facility].    PNEUMONIA:  - Reviewed recent hospitalization for bilateral pneumonia, including chest XR showing right middle lobe pneumonia and CT scan revealing bilateral infiltrates.  - Noted elevated white blood cell count (24,000) and completion of IV antibiotic treatment (Levaquin, Zithromax, Rocephin) during hospitalization, followed by oral Omnicef for 1 week.  - Patient tested negative for COVID-19 during admission.    SINUS TACHYCARDIA:  - Considered tachycardia may be related to recent sepsis during pneumonia.    THYROID DISORDER:  - Noted patient has been on Synthroid 25mcg for approximately 1 year.  - Instructed patient to obtain recent [facility] results from [facility] and send a photo of the lab report to the office.  - Emphasized importance of thyroid function in relation to tachycardia.  - Continued current Synthroid 25mcg dosage pending review of recent thyroid lab results.    BENIGN PROSTATIC HYPERPLASIA (BPH):  - Continued Cialis 5mg for BPH management.    FATIGUE:  - Considered potential causes of fatigue, including cardiac issues.    HYPERTENSION:  - Evaluated blood pressure as well-controlled (130/60 or 120/60).         Problem List Items Addressed This Visit       Alcohol use, unspecified with alcohol-induced anxiety disorder    Asthma     Chronic diastolic congestive heart failure    Hypertension    Hypothyroidism    Severe episode of recurrent major depressive disorder, without psychotic features    Severe persistent asthma, uncomplicated    Tachycardia    Relevant Medications    metoprolol succinate (TOPROL-XL) 25 MG 24 hr tablet     Other Visit Diagnoses       Pneumonia due to infectious organism, unspecified laterality, unspecified part of lung    -  Primary    Relevant Orders    X-Ray Chest PA And Lateral    Medication refill        Relevant Medications    metoprolol succinate (TOPROL-XL) 25 MG 24 hr tablet          No follow-ups on file.       Answers submitted by the patient for this visit:  Shortness of Breath Questionnaire (Submitted on 1/31/2025)  Chief Complaint: Shortness of breath  Chronicity: chronic  Onset: more than 1 month ago  Frequency: every several days  Progression since onset: waxing and waning  Episode duration: 30 Days  hemoptysis: No  sputum production: Yes  PND: Yes  syncope: No  claudication: No  leg pain: No  orthopnea: Yes  coryza: No  swollen glands: No  Improvement on treatment: mild  Risk factors for DVT/PE: no known risk factors  asthma: Yes  allergies: Yes  COPD: Yes  pneumonia: Yes  aspirin allergies: No  CAD: Yes  DVT: No  heart failure: No  PE: No  recent surgery: No  bronchiolitis: No  chronic lung disease: No    He sees his heart doc next week and I asked him to have him fax reports.Visit today included increased complexity associated with the care of the episodic problem htn addressed and managing the longitudinal care of the patient due to the serious and/or complex managed problem(s) htn.

## 2025-02-12 DIAGNOSIS — I10 HYPERTENSION: ICD-10-CM

## 2025-03-23 DIAGNOSIS — J45.50 SEVERE PERSISTENT ASTHMA, UNCOMPLICATED: ICD-10-CM

## 2025-03-24 ENCOUNTER — HOSPITAL ENCOUNTER (OUTPATIENT)
Dept: RADIOLOGY | Facility: HOSPITAL | Age: 75
Discharge: HOME | End: 2025-03-24
Attending: FAMILY MEDICINE
Payer: MEDICARE

## 2025-03-24 ENCOUNTER — RESULTS FOLLOW-UP (OUTPATIENT)
Dept: FAMILY MEDICINE | Facility: CLINIC | Age: 75
End: 2025-03-24

## 2025-03-24 DIAGNOSIS — J18.9 PNEUMONIA DUE TO INFECTIOUS ORGANISM, UNSPECIFIED LATERALITY, UNSPECIFIED PART OF LUNG: ICD-10-CM

## 2025-03-24 PROCEDURE — 71046 X-RAY EXAM CHEST 2 VIEWS: CPT | Mod: 26,,, | Performed by: RADIOLOGY

## 2025-03-24 PROCEDURE — 71046 X-RAY EXAM CHEST 2 VIEWS: CPT | Mod: TC,PO

## 2025-03-24 RX ORDER — MONTELUKAST SODIUM 10 MG/1
10 TABLET ORAL NIGHTLY
Qty: 90 TABLET | Refills: 3 | Status: SHIPPED | OUTPATIENT
Start: 2025-03-24

## 2025-03-24 NOTE — TELEPHONE ENCOUNTER
Care Due:                  Date            Visit Type   Department     Provider  --------------------------------------------------------------------------------                                ESTABLISHED                              PATIENT -    [facility] FAM[facility]  Last Visit: 02-      [facility]      MEDICINE       Kurt Davey  Next Visit: None Scheduled  None         None Found                                                            Last  Test          Frequency    Reason                     Performed    Due Date  --------------------------------------------------------------------------------    CBC.........  12 months..  meloxicam................  Not Found    Overdue    CMP.........  12 months..  meloxicam................  Not Found    Overdue    [employer] Embedded Care Due Messages. Reference number: [id number].   3/24/2025 9:03:13 AM CDT

## 2025-03-24 NOTE — TELEPHONE ENCOUNTER
Refill Routing Note   Medication(s) are not appropriate for processing by [facility] for the following reason(s):        No active prescription written by provider    [facility] action(s):  Defer               Appointments  past 12m or future 3m with PCP    Date Provider   Last Visit   2/7/2025 Kurt Davey MD   Next Visit   Visit date not found Kurt Davey MD   ED visits in past 90 days: 0        Note composed:12:46 PM 03/24/2025

## 2025-03-25 NOTE — PROGRESS NOTES
I am grateful that there is nothing significant at this time in your lungs as far as a pneumonia or a mass.  You do have atherosclerosis noted on the aorta which is expected at 74.  I am glad that there is no mass.

## 2025-04-24 DIAGNOSIS — M62.830 SPASM OF LUMBAR PARASPINOUS MUSCLE: Chronic | ICD-10-CM

## 2025-04-24 RX ORDER — MELOXICAM 15 MG/1
15 TABLET ORAL DAILY PRN
Qty: 90 TABLET | Refills: 3 | Status: SHIPPED | OUTPATIENT
Start: 2025-04-24

## 2025-04-24 NOTE — TELEPHONE ENCOUNTER
Refill Routing Note   Medication(s) are not appropriate for processing by [facility] for the following reason(s):        Outside of protocol    [facility] action(s):  Route               Appointments  past 12m or future 3m with PCP    Date Provider   Last Visit   2/7/2025 Kurt Davey MD   Next Visit   Visit date not found Kurt Davey MD   ED visits in past 90 days: 0        Note composed:9:10 AM 04/24/2025

## 2025-04-24 NOTE — TELEPHONE ENCOUNTER
No care due was identified.  [facility] Embedded Care Due Messages. Reference number: [id number].   4/24/2025 3:38:52 AM CDT